# Patient Record
Sex: MALE | Race: WHITE | NOT HISPANIC OR LATINO | Employment: OTHER | URBAN - METROPOLITAN AREA
[De-identification: names, ages, dates, MRNs, and addresses within clinical notes are randomized per-mention and may not be internally consistent; named-entity substitution may affect disease eponyms.]

---

## 2018-03-08 ENCOUNTER — TRANSFERRED RECORDS (OUTPATIENT)
Dept: HEALTH INFORMATION MANAGEMENT | Facility: CLINIC | Age: 73
End: 2018-03-08

## 2019-02-01 ENCOUNTER — TRANSFERRED RECORDS (OUTPATIENT)
Dept: HEALTH INFORMATION MANAGEMENT | Facility: CLINIC | Age: 74
End: 2019-02-01

## 2019-02-06 ENCOUNTER — RECORDS - HEALTHEAST (OUTPATIENT)
Dept: ADMINISTRATIVE | Facility: OTHER | Age: 74
End: 2019-02-06

## 2019-02-07 ENCOUNTER — RECORDS - HEALTHEAST (OUTPATIENT)
Dept: ADMINISTRATIVE | Facility: OTHER | Age: 74
End: 2019-02-07

## 2019-02-07 ENCOUNTER — HOSPITAL ENCOUNTER (OUTPATIENT)
Dept: RADIOLOGY | Facility: HOSPITAL | Age: 74
Discharge: HOME | End: 2019-02-07
Attending: FAMILY MEDICINE

## 2019-02-07 DIAGNOSIS — R13.10 DYSPHAGIA: ICD-10-CM

## 2019-02-13 ENCOUNTER — RECORDS - HEALTHEAST (OUTPATIENT)
Dept: ADMINISTRATIVE | Facility: OTHER | Age: 74
End: 2019-02-13

## 2019-02-19 ENCOUNTER — TRANSFERRED RECORDS (OUTPATIENT)
Dept: HEALTH INFORMATION MANAGEMENT | Facility: CLINIC | Age: 74
End: 2019-02-19

## 2019-02-19 ENCOUNTER — RECORDS - HEALTHEAST (OUTPATIENT)
Dept: ADMINISTRATIVE | Facility: OTHER | Age: 74
End: 2019-02-19

## 2019-02-19 ENCOUNTER — HOSPITAL ENCOUNTER (OUTPATIENT)
Dept: PET IMAGING | Facility: HOSPITAL | Age: 74
Discharge: HOME | End: 2019-02-19
Attending: FAMILY MEDICINE

## 2019-02-19 DIAGNOSIS — K62.89 MASS OF PERIRECTAL SOFT TISSUE: ICD-10-CM

## 2019-02-19 LAB — GLUCOSE BLDC GLUCOMTR-MCNC: 92 MG/DL (ref 70–139)

## 2019-02-19 ASSESSMENT — MIFFLIN-ST. JEOR: SCORE: 1836.69

## 2019-02-21 ENCOUNTER — HOSPITAL ENCOUNTER (OUTPATIENT)
Dept: CT IMAGING | Facility: HOSPITAL | Age: 74
Discharge: HOME | End: 2019-02-21
Attending: FAMILY MEDICINE | Admitting: RADIOLOGY

## 2019-02-21 ENCOUNTER — TRANSFERRED RECORDS (OUTPATIENT)
Dept: HEALTH INFORMATION MANAGEMENT | Facility: CLINIC | Age: 74
End: 2019-02-21

## 2019-02-21 DIAGNOSIS — K62.89 MASS OF PERIRECTAL SOFT TISSUE: ICD-10-CM

## 2019-02-21 LAB
HGB BLD-MCNC: 15.1 G/DL (ref 14–18)
INR PPP: 0.96 (ref 0.9–1.1)
PLATELET # BLD AUTO: 213 THOU/UL (ref 140–440)

## 2019-02-21 ASSESSMENT — MIFFLIN-ST. JEOR: SCORE: 1836.69

## 2019-02-26 LAB
CAP COMMENT: ABNORMAL
LAB AP CHARGES (HE HISTORICAL CONVERSION): ABNORMAL
LAB AP INITIAL CYTO EVAL (HE HISTORICAL CONVERSION): ABNORMAL
LAB MED GENERAL PATH INTERP (HE HISTORICAL CONVERSION): ABNORMAL
PATH REPORT.COMMENTS IMP SPEC: ABNORMAL
PATH REPORT.COMMENTS IMP SPEC: ABNORMAL
PATH REPORT.FINAL DX SPEC: ABNORMAL
PATH REPORT.MICROSCOPIC SPEC OTHER STN: ABNORMAL
PATH REPORT.RELEVANT HX SPEC: ABNORMAL
SPECIMEN DESCRIPTION: ABNORMAL

## 2019-03-08 ENCOUNTER — TRANSFERRED RECORDS (OUTPATIENT)
Dept: HEALTH INFORMATION MANAGEMENT | Facility: CLINIC | Age: 74
End: 2019-03-08

## 2019-03-12 ENCOUNTER — TRANSFERRED RECORDS (OUTPATIENT)
Dept: HEALTH INFORMATION MANAGEMENT | Facility: CLINIC | Age: 74
End: 2019-03-12

## 2019-03-12 ENCOUNTER — TELEPHONE (OUTPATIENT)
Dept: SURGERY | Facility: CLINIC | Age: 74
End: 2019-03-12

## 2019-03-12 NOTE — TELEPHONE ENCOUNTER
[facility] Call Center    Phone Message    May a detailed message be left on voicemail: yes    Reason for Call: Other: Melissa Dykes MD referring Pt for possible malignany metastatic schwannoma in ischioractal fossa. No records received as of yet - only referral. Pt previously seen at [facility]      Action Taken: Message routed to:  [facility] ([facility] Colon Rectal

## 2019-03-21 ENCOUNTER — TELEPHONE (OUTPATIENT)
Dept: SURGERY | Facility: CLINIC | Age: 74
End: 2019-03-21

## 2019-03-21 DIAGNOSIS — D12.6: Primary | ICD-10-CM

## 2019-03-21 NOTE — TELEPHONE ENCOUNTER
[facility] Call Center    Phone Message    May a detailed message be left on voicemail: yes    Reason for Call: Other: Patient is calling in regarding a MRI and stated he talked to   TYRON MCPHERSON and she told him he needs an MRI before he sees  and there is no order and he never heard back. He is calling to check status on this. Please follow up with the patient asap. Thank you.    Action Taken: Message routed to:  [facility] ([facility] colon and rectal

## 2019-03-22 NOTE — TELEPHONE ENCOUNTER
Patient was called back to schedule his MRI prior to his clinic visit with Dr. Gold. Patient is scheduled for Sunday at 7:00 am. Patient stated understanding of appointment date, time, and location. Patient is in agreement with this plan of care. Patient's questions and concerns were addressed to his stated satisfaction. Patient will callback directly with further questions or concerns.

## 2019-03-22 NOTE — TELEPHONE ENCOUNTER
Patient scheduled to see Dr. Gold on 3/26 with his MRI on 3/25. Patient stated understanding of appointment date, time, and location. Patient is in agreement with this plan of care. Patient's questions and concerns were addressed to his stated satisfaction. Patient will callback directly with further questions or concerns.

## 2019-03-24 ENCOUNTER — ANCILLARY PROCEDURE (OUTPATIENT)
Dept: MRI IMAGING | Facility: CLINIC | Age: 74
End: 2019-03-24
Attending: COLON & RECTAL SURGERY
Payer: COMMERCIAL

## 2019-03-24 DIAGNOSIS — D12.6: ICD-10-CM

## 2019-03-24 RX ORDER — GADOBUTROL 604.72 MG/ML
10 INJECTION INTRAVENOUS ONCE
Status: COMPLETED | OUTPATIENT
Start: 2019-03-24 | End: 2019-03-24

## 2019-03-24 RX ADMIN — GADOBUTROL 10 ML: 604.72 INJECTION INTRAVENOUS at 07:02

## 2019-03-24 NOTE — DISCHARGE INSTRUCTIONS
MRI Contrast Discharge Instructions    The IV contrast you received today will pass out of your body in your  urine. This will happen in the next 24 hours. You will not feel this process.  Your urine will not change color.    Drink at least 4 extra glasses of water or juice today (unless your doctor  has restricted your fluids). This reduces the stress on your kidneys.  You may take your regular medicines.    If you are on dialysis: It is best to have dialysis today.    If you have a reaction: Most reactions happen right away. If you have  any new symptoms after leaving the hospital (such as hives or swelling),  call your hospital at the correct number below. Or call your family doctor.  If you have breathing distress or wheezing, call 911.    Special instructions: ***    I have read and understand the above information.    Signature:______________________________________ Date:___________    Staff:__________________________________________ Date:___________     Time:__________    [place] [facility] Departments:    ___[place]: [phone]  ___[facility]: [phone]  ___[place]: [phone] ___[facility]: [phone]  ___[facility]: [phone]  ___[facility]: [phone]  ___[phone]  ___[facility]: [phone]  ___[phone]

## 2019-03-25 NOTE — PROGRESS NOTES
Colon and Rectal Surgery Clinic Note    RE: Walt Jennings  : 1945  CONCEPCION: 3/26/2019    Mirela is a very pleasant 74 year old male who presents today for ischiorectal fossa schwannoma.    HPI:      Perirectal mass in  with biopsy showing a peripheral nerve sheath tumor consistent with Schwannoma    He reportedly saw an oncologist with no further recommendations at that time and was having radiation of his head and neck cancer at that time    He had an MRI of his spine in  that showed several lumbar intrathecal masses. He saw Dr. Antonia Rodriguez for these and she thought they likely could be shwanommas and unlikely to be malignant.    He developed abdominal pain after a trip to [place] in January    CT 2019 with a poorly characterized 5.3 X 2.5 cm soft tissue mass adjacent to the right ischial tuberosity extending into the right ischiorectal fossa and an indeterminate small 7 mm soft tissue nodule within the fat in the right ileac fossa    PET/CT 19 showed FDG avid soft tissue masses in the ischioanal fossae bilaterally.     CT guided needle biopsy of the left sided nodule showed spindle cell lesion with focal atypia    He was seen by Dr. Melissa Dykes a few weeks ago who referred him here for another opinion.    3/24/19 MRI:   1. In the right ischiorectal fossa extending to the sciatic notch,   there is a 7.2 cm well-circumscribed mass. Per chart review, this mass   was biopsied on 2011 with pathology results of a schwannoma.   Although exact comparison of size from 2011 is limited due to   single axial acquisition from the prior exam, the mass appears similar   in size.   2. Along the medial margin of the left gluteus daylin muscle, there   is a 2.7 cm well-circumscribed mass. Per chart review, this mass was   biopsied 2019 with pathology results of a spindle cell tumor.   Although exact comparison of size from 2011 is limited due to   single axial acquisition from the prior exam,  "the mass appears   unchanged in size.   3. 5 mm nodule anterior to the right gluteus daylin musculature could   represent a 3rd nerve sheath tumor other differential also includes   enhancing lymph node.   4. Prostatomegaly.    PMH: Cutaneous melanoma X2, squamous cell carcinoma of the head and neck; he has had several soft tissue lesions ?lipomas removed and has a few palpable subcutaneous masses on his arms currently.  FH: Mother with lung cancer. Multiple members of his mother's family and his brothers with multiple soft tissue masses path unknown.  SH: Bilateral hip replacement. Left hip was recalled and revised but he continues to have decreased mobility on this side. Inguinal hernia repair in 2015 and two abdominal hernia repairs in the past.  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 No ETOH or tobacco use.    Interval History: Mirela reports that he is completely asymptomatic. He denies numbness, tingling, weakness, pain, difficulty walking, or difficulty with bowel movements.   Physical examination:  Examination was chaperoned by Bianca Tran, NP     Vitals: BP (!) 163/96 (BP Location: Left arm, Patient Position: Sitting, Cuff Size: Adult Regular)   Pulse 69   Ht 5' 9\"   Wt 251 lb 9.6 oz   SpO2 98%   BMI 37.15 kg/m    BMI= Body mass index is 37.15 kg/m .    Alert, oriented, in no acute distress, sitting comfortable. Mucous membranes moist. Bilateral arms with small, smooth lesions palpable on undersides of upper arms.  Favor lipoma but cannot exclude neurofibroma or schwannoma    Laboratory data:    Recent Labs   Lab Test 09/22/11  0704  09/21/11  0615   WBC  --   --  7.0   HGB 9.6*  --  9.3*   PLT  --   --  299   CR  --   --  0.80   INR 1.10   < > 1.10    < > = values in this interval not displayed.       Assessment/plan: He is currently asymptomatic. I reviewed Mirela's imaging with radiology, Dr. Corrie Orozco.  I think these masses likely represent benign schwannomas but I am " concerned about the heterogenicity and size of the right sided mass and would consider biopsy of this site to ensure it is not a malignant peripheral nerve sheath tumor. The mass seems to arise from the sciatic nerve making resection potentially hazardous, particularly given that the patient is asymtomatic. I briefly reviewed his case with Dr. Ovi Collado and discussed potential role of biopsy of the larger right sided lesion and EMG of the RLE to detect potential nerve compression. Dr. Collado recommended that Mirela see Dr. Missael Pack who is a neuro oncologist and he did not feel that EMG would guide therapy in the absence of symptoms.  The large number of diffuse lesions in the spine and pelvis raises the question of schwannomatosis versus less likely neurofibromatosis, particularly in the setting of a positive family history on the maternal side of numerous soft tissue masses that required excision.  Accordingly I have recommended that he meet with one of our genetic counselors for genetic testing. We will place referrals to both Dr. Pack and to the Cancer Risk Management Program.  Patient's questions were answered to his stated satisfaction and he is in agreement with this plan.    Total face to face time was 45 minutes, >50% counseling.    For details of past medical history, surgical history, family history, medications, allergies, and review of systems, please see details below.    Medical history:  Past Medical History:   Diagnosis Date     Cancer of neck (H)     recent radiation treatment     Chronic pain        Surgical history:  Past Surgical History:   Procedure Laterality Date     ARTHROPLASTY REVISION HIP  9/12/2011    Procedure:ARTHROPLASTY REVISION HIP; Left Total Hip Revision, Cup Only; Surgeon:JILL SANDERS; Location:[facility]     ARTHROSCOPY KNEE      right     BACK SURGERY      lower     HERNIA REPAIR       JOINT REPLACEMENT      left       Family history:  No family history on  "file.    Medications:  Current Outpatient Medications   Medication Sig Dispense Refill     ibuprofen (IBU-200) 200 MG tablet Take 200 mg by mouth every 4 hours as needed.       sildenafil (VIAGRA) 50 MG tablet Take 1 tablet by mouth daily as needed.         Allergies:  The patientis allergic to percocet [oxycodone-acetaminophen].    Social history:  Social History     Tobacco Use     Smoking status: Never Smoker     Smokeless tobacco: Never Used   Substance Use Topics     Alcohol use: Yes     Comment: 1-2 per month     Marital status: .    Review of Systems:  Nursing Notes:   Rona Ware EMT  3/26/2019  9:02 AM  Signed  Chief Complaint   Patient presents with     Consult     Metastatic Schwannoma of ischoirectal fossa.       Vitals:    03/26/19 0856   BP: (!) 163/96   BP Location: Left arm   Patient Position: Sitting   Cuff Size: Adult Regular   Pulse: 69   SpO2: 98%   Weight: 251 lb 9.6 oz   Height: 5' 9\"       Body mass index is 37.15 kg/m .      Rona Ware, EMT                             David Gold MD   Professor and Chief  Division of Colon and Rectal Surgery  [facility]      Referring Provider:  Melissa Dykes MD  [facility]  [address]  [place] [address]     Primary Care Provider:  Johnny Rausch    This note was created using speech recognition software and may contain unintended word substitutions.  "

## 2019-03-26 ENCOUNTER — OFFICE VISIT (OUTPATIENT)
Dept: SURGERY | Facility: CLINIC | Age: 74
End: 2019-03-26
Payer: COMMERCIAL

## 2019-03-26 VITALS
OXYGEN SATURATION: 98 % | BODY MASS INDEX: 37.26 KG/M2 | HEIGHT: 69 IN | DIASTOLIC BLOOD PRESSURE: 96 MMHG | WEIGHT: 251.6 LBS | HEART RATE: 69 BPM | SYSTOLIC BLOOD PRESSURE: 163 MMHG

## 2019-03-26 DIAGNOSIS — D36.10 SCHWANNOMA: Primary | ICD-10-CM

## 2019-03-26 ASSESSMENT — PATIENT HEALTH QUESTIONNAIRE - PHQ9
SUM OF ALL RESPONSES TO PHQ QUESTIONS 1-9: 0
SUM OF ALL RESPONSES TO PHQ QUESTIONS 1-9: 0
10. IF YOU CHECKED OFF ANY PROBLEMS, HOW DIFFICULT HAVE THESE PROBLEMS MADE IT FOR YOU TO DO YOUR WORK, TAKE CARE OF THINGS AT HOME, OR GET ALONG WITH OTHER PEOPLE: NOT DIFFICULT AT ALL

## 2019-03-26 ASSESSMENT — MIFFLIN-ST. JEOR: SCORE: 1876.63

## 2019-03-26 NOTE — NURSING NOTE
"Chief Complaint   Patient presents with     Consult     Metastatic Schwannoma of ischoirectal fossa.       Vitals:    03/26/19 0856   BP: (!) 163/96   BP Location: Left arm   Patient Position: Sitting   Cuff Size: Adult Regular   Pulse: 69   SpO2: 98%   Weight: 251 lb 9.6 oz   Height: 5' 9\"       Body mass index is 37.15 kg/m .      Rona Ware, EMT                      "

## 2019-03-26 NOTE — LETTER
3/26/2019       RE: Walt Jennings  [address]  [place] [address]     Dear Colleague,    Thank you for referring your patient, Walt Jennings, to the [facility] COLON AND RECTAL SURGERY at [facility]. Please see a copy of my visit note below.    Colon and Rectal Surgery Clinic Note    RE: Walt Jennings  : 1945  CONCEPCION: 3/26/2019    Mirela is a very pleasant 74 year old male who presents today for ischiorectal fossa schwannoma.    HPI:    Perirectal mass in  with biopsy showing a peripheral nerve sheath tumor consistent with Schwannoma    He reportedly saw an oncologist with no further recommendations at that time and was having radiation of his head and neck cancer at that time    He had an MRI of his spine in  that showed several lumbar intrathecal masses. He saw Dr. Antonia Rodriguez for these and she thought they likely could be shwanommas and unlikely to be malignant.    He developed abdominal pain after a trip to [place] in January    CT 2019 with a poorly characterized 5.3 X 2.5 cm soft tissue mass adjacent to the right ischial tuberosity extending into the right ischiorectal fossa and an indeterminate small 7 mm soft tissue nodule within the fat in the right ileac fossa    PET/CT 19 showed FDG avid soft tissue masses in the ischioanal fossae bilaterally.     CT guided needle biopsy of the left sided nodule showed spindle cell lesion with focal atypia    He was seen by Dr. Melissa Dykes a few weeks ago who referred him here for another opinion.    3/24/19 MRI:   1. In the right ischiorectal fossa extending to the sciatic notch,   there is a 7.2 cm well-circumscribed mass. Per chart review, this mass   was biopsied on 2011 with pathology results of a schwannoma.   Although exact comparison of size from 2011 is limited due to   single axial acquisition from the prior exam, the mass appears similar   in size.   2. Along the medial margin  "of the left gluteus daylin muscle, there   is a 2.7 cm well-circumscribed mass. Per chart review, this mass was   biopsied 2/21/2019 with pathology results of a spindle cell tumor.   Although exact comparison of size from 2/28/2011 is limited due to   single axial acquisition from the prior exam, the mass appears   unchanged in size.   3. 5 mm nodule anterior to the right gluteus daylin musculature could   represent a 3rd nerve sheath tumor other differential also includes   enhancing lymph node.   4. Prostatomegaly.    PMH: Cutaneous melanoma X2, squamous cell carcinoma of the head and neck; he has had several soft tissue lesions ?lipomas removed and has a few palpable subcutaneous masses on his arms currently.  FH: Mother with lung cancer. Multiple members of his mother's family and his brothers with multiple soft tissue masses path unknown.  SH: Bilateral hip replacement. Left hip was recalled and revised but he continues to have decreased mobility on this side. Inguinal hernia repair in 2015 and two abdominal hernia repairs in the past.  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 No ETOH or tobacco use.    Interval History: Mirela reports that he is completely asymptomatic. He denies numbness, tingling, weakness, pain, difficulty walking, or difficulty with bowel movements.   Physical examination:  Examination was chaperoned by Bianca Tran NP     Vitals: BP (!) 163/96 (BP Location: Left arm, Patient Position: Sitting, Cuff Size: Adult Regular)   Pulse 69   Ht 5' 9\"   Wt 251 lb 9.6 oz   SpO2 98%   BMI 37.15 kg/m     BMI= Body mass index is 37.15 kg/m .    Alert, oriented, in no acute distress, sitting comfortable. Mucous membranes moist. Bilateral arms with small, smooth lesions palpable on undersides of upper arms.  Favor lipoma but cannot exclude neurofibroma or schwannoma    Laboratory data:    Recent Labs   Lab Test 09/22/11  0704  09/21/11  0615   WBC  --   --  7.0   HGB 9.6*  " --  9.3*   PLT  --   --  299   CR  --   --  0.80   INR 1.10   < > 1.10    < > = values in this interval not displayed.       Assessment/plan: He is currently asymptomatic. I reviewed Mirela's imaging with radiology, Dr. Corrie Orozco.  I think these masses likely represent benign schwannomas but I am concerned about the heterogenicity and size of the right sided mass and would consider biopsy of this site to ensure it is not a malignant peripheral nerve sheath tumor. The mass seems to arise from the sciatic nerve making resection potentially hazardous, particularly given that the patient is asymtomatic. I briefly reviewed his case with Dr. Ovi Collado and discussed potential role of biopsy of the larger right sided lesion and EMG of the RLE to detect potential nerve compression. Dr. Collado recommended that Mirela see Dr. Missael Pack who is a neuro oncologist and he did not feel that EMG would guide therapy in the absence of symptoms.  The large number of diffuse lesions in the spine and pelvis raises the question of schwannomatosis versus less likely neurofibromatosis, particularly in the setting of a positive family history on the maternal side of numerous soft tissue masses that required excision.  Accordingly I have recommended that he meet with one of our genetic counselors for genetic testing. We will place referrals to both Dr. Pack and to the Cancer Risk Management Program.  Patient's questions were answered to his stated satisfaction and he is in agreement with this plan.    Total face to face time was 45 minutes, >50% counseling.    For details of past medical history, surgical history, family history, medications, allergies, and review of systems, please see details below.    Medical history:  Past Medical History:   Diagnosis Date     Cancer of neck (H)     recent radiation treatment     Chronic pain        Surgical history:  Past Surgical History:   Procedure Laterality Date     ARTHROPLASTY REVISION HIP   "9/12/2011    Procedure:ARTHROPLASTY REVISION HIP; Left Total Hip Revision, Cup Only; Surgeon:JILL SANDERS; Location:[facility]     ARTHROSCOPY KNEE      right     BACK SURGERY      lower     HERNIA REPAIR       JOINT REPLACEMENT      left       Family history:  No family history on file.    Medications:  Current Outpatient Medications   Medication Sig Dispense Refill     ibuprofen (IBU-200) 200 MG tablet Take 200 mg by mouth every 4 hours as needed.       sildenafil (VIAGRA) 50 MG tablet Take 1 tablet by mouth daily as needed.         Allergies:  The patientis allergic to percocet [oxycodone-acetaminophen].    Social history:  Social History     Tobacco Use     Smoking status: Never Smoker     Smokeless tobacco: Never Used   Substance Use Topics     Alcohol use: Yes     Comment: 1-2 per month     Marital status: .    Review of Systems:  Nursing Notes:   Rona Ware EMT  3/26/2019  9:02 AM  Signed  Chief Complaint   Patient presents with     Consult     Metastatic Schwannoma of ischoirectal fossa.       Vitals:    03/26/19 0856   BP: (!) 163/96   BP Location: Left arm   Patient Position: Sitting   Cuff Size: Adult Regular   Pulse: 69   SpO2: 98%   Weight: 251 lb 9.6 oz   Height: 5' 9\"       Body mass index is 37.15 kg/m .    Rona Ware, EMT     David Gold MD   Professor and Chief  Division of Colon and Rectal Surgery  [facility]    Referring Provider:  Melissa Dykes MD  [facility]  [address] [place] [address] [address]  [place] [address]     Primary Care Provider:  Johnny Rausch    This note was created using speech recognition software and may contain unintended word substitutions.    "

## 2019-03-26 NOTE — PATIENT INSTRUCTIONS
Please call with any questions or concerns regarding your clinic visit today.    It is a pleasure being involved in your health care.    Contacts post-consultation depending on your need:    Radiology Appointments [phone]    Schedule Clinic Appointments [phone] # 1   M-F 7:30 - 5 pm    [facility] [phone]    Clinic Fax Number [phone]    Surgery Scheduling [phone]    My Chart is available 24 hours a day and is a secure way to access your records and communicate with your care team.  I strongly recommend signing up if you haven't already done so, if you are comfortable with computers.  If you would like to inquire about this or are having problems with My Chart access, you may call [phone] or go online at [email or web address].  Please allow at least 24 hours for a response and extra time on weekends and Holidays.

## 2019-03-27 ENCOUNTER — TELEPHONE (OUTPATIENT)
Dept: ONCOLOGY | Facility: CLINIC | Age: 74
End: 2019-03-27

## 2019-03-27 ASSESSMENT — PATIENT HEALTH QUESTIONNAIRE - PHQ9: SUM OF ALL RESPONSES TO PHQ QUESTIONS 1-9: 0

## 2019-03-27 NOTE — TELEPHONE ENCOUNTER
ONCOLOGY INTAKE: Records Information      APPT INFORMATION: [place]07 [place] [facility]  Referring provider:  Schwannoma   Referring provider s clinic:  [facility]  Reason for visit/diagnosis:  Schwannoma [D36.10    Were the records received with the referral (via [employer])? Complete    Has patient been seen for any external appt for this diagnosis (enter clinic/location)? No    ADDITIONAL INFORMATION:  Dx:Schwannoma [D36.10]: Caller intake: Ref by:Schwannoma [D36.10]-[facility]: Records In [facility]

## 2019-04-22 ENCOUNTER — PATIENT OUTREACH (OUTPATIENT)
Dept: SURGERY | Facility: CLINIC | Age: 74
End: 2019-04-22

## 2019-04-22 NOTE — PROGRESS NOTES
Patient was called back in regard to his voicemail patient's wife was advised that we are trying to move his follow up appointments to a sooner date. Patient's wife stated understanding of these instructions.

## 2019-06-05 ENCOUNTER — OFFICE VISIT (OUTPATIENT)
Dept: CONSULT | Facility: CLINIC | Age: 74
End: 2019-06-05
Attending: PEDIATRICS
Payer: COMMERCIAL

## 2019-06-05 ENCOUNTER — OFFICE VISIT (OUTPATIENT)
Dept: PEDIATRIC HEMATOLOGY/ONCOLOGY | Facility: CLINIC | Age: 74
End: 2019-06-05
Attending: PEDIATRICS
Payer: COMMERCIAL

## 2019-06-05 VITALS
BODY MASS INDEX: 36.63 KG/M2 | SYSTOLIC BLOOD PRESSURE: 112 MMHG | DIASTOLIC BLOOD PRESSURE: 93 MMHG | TEMPERATURE: 98.6 F | RESPIRATION RATE: 20 BRPM | OXYGEN SATURATION: 97 % | HEART RATE: 117 BPM | WEIGHT: 248.02 LBS

## 2019-06-05 DIAGNOSIS — C44.92 SQUAMOUS CELL CARCINOMA OF SKIN: Primary | ICD-10-CM

## 2019-06-05 DIAGNOSIS — D36.10 SCHWANNOMA: ICD-10-CM

## 2019-06-05 DIAGNOSIS — Z85.819 HISTORY OF THROAT CANCER: ICD-10-CM

## 2019-06-05 DIAGNOSIS — C43.59 MALIGNANT MELANOMA OF SKIN OF TRUNK (H): ICD-10-CM

## 2019-06-05 DIAGNOSIS — D12.6: ICD-10-CM

## 2019-06-05 DIAGNOSIS — Z85.820 HISTORY OF MELANOMA: Primary | ICD-10-CM

## 2019-06-05 PROCEDURE — 96040 ZZH GENETIC COUNSELING, EACH 30 MINUTES: CPT | Mod: ZF | Performed by: GENETIC COUNSELOR, MS

## 2019-06-05 PROCEDURE — G0463 HOSPITAL OUTPT CLINIC VISIT: HCPCS | Mod: ZF

## 2019-06-05 ASSESSMENT — ENCOUNTER SYMPTOMS
FEVER: 0
NUMBNESS: 0
EYE ITCHING: 0
GASTROINTESTINAL NEGATIVE: 1
WEAKNESS: 0
PHOTOPHOBIA: 0
FATIGUE: 0
CARDIOVASCULAR NEGATIVE: 1
NEUROLOGICAL NEGATIVE: 1
RESPIRATORY NEGATIVE: 1
CHILLS: 0
PSYCHIATRIC NEGATIVE: 1
CONSTITUTIONAL NEGATIVE: 1
EYE PAIN: 0
UNEXPECTED WEIGHT CHANGE: 0

## 2019-06-05 NOTE — LETTER
Date:June 7, 2019      Provider requested that no letter be sent. Do not send.       [facility] Health Information

## 2019-06-05 NOTE — LETTER
6/5/2019      RE: Walt Jennings  [address]  [place] [address]          Pediatric Hematology/Oncology Clinic Note       HPI-  Walt Jennings is a 74 year old male with pelvic schwannomas schwannoma who presents to the clinic for a consultation as requested by Dr. Gold regarding possible Schwannomatosis. Patient was first found to have incidental finding in right pelvis on whole body imaging after diagnosis of throat cancer in 2011. Biopsy found the lesion to be a schwannoma, but no treatment was started due to patient remaining asymptomatic.    Walt presented in February 2019 with one day of nausea, vomiting, diarrhea and frequent urination shortly after returning from [place]. Imaging during hospitalization found 2 additional small masses close by. PET scan showed focal atypia. MRI on 3/24/19 and follow-up biopsy identified the same schwannoma from 2011, a spindle cell tumor, and a 5 mm nodule near the R sciatic nerve.    Since then, Walt has remained asymptomatic. He has residual left hip pain from a hip replacement that was followed by a  revision. He ambulates without pain when he uses a cane, and often neglects to use the cane as well. He denies weight change, fatigue, night sweats, headaches, numbness/tingling, or any other pain.     Fam/Soc: Walt is a retired , who lives with his wife. He is an avid fisherman and travels frequently to go fishing. There is no history of neurofibromatosis in his family.    Walt has a history of melanoma s/p surgical removal in 2010, throat cancer s/p surgery and radiation in 2011 and left hip replacement.    History was obtained from the patient.       Allergies   Allergen Reactions     Percocet [Oxycodone-Acetaminophen] Diarrhea       Current Outpatient Medications   Medication     ibuprofen (IBU-200) 200 MG tablet     sildenafil (VIAGRA) 50 MG tablet     No current facility-administered medications for this visit.         Past Medical History:   Diagnosis Date     Cancer of neck (H)     recent radiation treatment     Chronic pain        Past Surgical History:   Procedure Laterality Date     ARTHROPLASTY REVISION HIP  9/12/2011    Procedure:ARTHROPLASTY REVISION HIP; Left Total Hip Revision, Cup Only; Surgeon:JILL SANDERS; Location:[facility]     ARTHROSCOPY KNEE      right     BACK SURGERY      lower     HERNIA REPAIR       JOINT REPLACEMENT      left       No family history on file.    Review of Systems   Constitutional: Negative.  Negative for chills, fatigue, fever and unexpected weight change.   HENT: Negative.    Eyes: Negative for photophobia, pain and itching.        Positive for watery eyes.   Respiratory: Negative.    Cardiovascular: Negative.    Gastrointestinal: Negative.    Genitourinary: Negative.    Skin:        2 lumps on right arm   Neurological: Negative.  Negative for weakness and numbness.   Psychiatric/Behavioral: Negative.        BP (!) 112/93 (BP Location: Left arm, Patient Position: Fowlers, Cuff Size: Adult Regular)   Pulse 117   Temp 98.6  F (37  C) (Oral)   Resp 20   Wt 112.5 kg (248 lb 0.3 oz)   SpO2 97%   BMI 36.63 kg/m     Physical Exam   Constitutional: He is oriented to person, place, and time. He appears well-developed and well-nourished.   HENT:   Head: Normocephalic and atraumatic.   Eyes: Pupils are equal, round, and reactive to light. Conjunctivae and EOM are normal.   Neck: Normal range of motion. Neck supple.   Cardiovascular: Normal rate, regular rhythm and normal heart sounds.   Pulmonary/Chest: Effort normal and breath sounds normal.   Abdominal: Soft. Bowel sounds are normal.   Musculoskeletal: He exhibits tenderness.   Left hip tenderness over replacement site     Neurological: He is alert and oriented to person, place, and time.   Skin: Skin is warm and dry.   Psychiatric: He has a normal mood and affect. His behavior is normal.         Results for orders placed or performed  in visit on 03/24/19   MR Pelvis (Intrapelvic Organs) wo&w Contrast    Narrative    EXAMINATION: MR PELVIS (INTRAPELVIC ORGANS) WO&W CONTRAST,  3/24/2019 8:21 AM     COMPARISON: PET/CT 2/19/2019    TECHNIQUE:  Images were acquired without and with intravenous contrast  through the pelvis. The following MR images were acquired without  contrast: multiplanar T1, multiplanar T2, axial diffusion-weighted and  axial apparent diffusion coefficient. T1-weighted images with fat  saturation were acquired at the following intervals relative to  intravenous contrast administration: pre-contrast, immediate post  contrast, 1 minute, 3 minutes and delayed.  Contrast dose: 10 mL  Gadavist, 1 mg glucagon    HISTORY: malignancy metastatic schwannoma; Schwannoma of colon    FINDINGS:    In the right ischiorectal fossa, there is a 7.2 x 3.2 cm ovoid  well-circumscribed mass which is predominantly T2 hyperintense, avidly  enhancing, and demonstrates restricted diffusion (series 7 images  18-30). The mass extends into the sciatic notch and potentially arises  from a posterior branch of the sciatic nerve. No involvement of the  anal sphincter complex. Although exact comparison of size from  2/28/2011 is limited due to single axial acquisition from the prior  exam, the mass appears similar in size.    Along the medial margin of the left gluteus daylin muscle, there is a  similar-appearing 2.7 x 1.6 cm ovoid well-circumscribed mass which is  predominantly T2 hyperintense, avidly enhancing, and demonstrates  restricted diffusion (series 8 images 22-25). Although exact  comparison of size from 2/28/2011 is limited due to single axial  acquisition from the prior exam, the mass appears unchanged in size.     Just anterior to the right gluteus musculature on series 23 image 45  and series 7 image 32 there is a nodule measuring 5 mm in short axis  which is enhancing with T2 hyperintensity. Although small, the imaging  characteristics are  similar to the other pelvic masses.    No rectosigmoid tumor. Diverticulosis of the sigmoid colon. The  prostate gland measures 4.2 x 6.0 x 5.4 cm (71 cc). Multifocal  wedge-shaped T2 hypointensity throughout the peripheral zone without  focal nodularity. No free fluid or suspicious lymphadenopathy.  Bilateral hip arthroplasties.      Impression    IMPRESSION:   1. In the right ischiorectal fossa extending to the sciatic notch,  there is a 7.2 cm well-circumscribed mass. Per chart review, this mass  was biopsied on 2/28/2011 with pathology results of a schwannoma.  Although exact comparison of size from 2/28/2011 is limited due to  single axial acquisition from the prior exam, the mass appears similar  in size.  2. Along the medial margin of the left gluteus daylin muscle, there  is a 2.7 cm well-circumscribed mass. Per chart review, this mass was  biopsied 2/21/2019 with pathology results of a spindle cell tumor.  Although exact comparison of size from 2/28/2011 is limited due to  single axial acquisition from the prior exam, the mass appears  unchanged in size.  3. 5 mm nodule anterior to the right gluteus daylin musculature could  represent a 3rd nerve sheath tumor other differential also includes  enhancing lymph node.  4. Prostatomegaly.    I have personally reviewed the examination and initial interpretation  and I agree with the findings.    ZEKE PERRY MD         Impression:  1. Schwannomas - MRI shows several lesions in pelvic area including one heterogeneous larger lesion in right ischiorectal fossa concerning for malignant degeneration. Differential diagnosis includes schwannomatosis and NF2.    Plan:  1. RTC for PET scan in Late July.  2. Schedule surgery to remove pelvic lesion with known cellular atypia and heterogeneity, as well as left gluteal schwannoma.  3. Genetic testing for NF2 to assess risk for family members, pending insurance approval.  4. MRI brain, spine, and pelvis to assess for  other schwannomas or characteristic lesions of NF2..          Gregorio Pack    CC  Patient Care Team:  Johnny Rausch MD as PCP - General  COLLEEN ROME    Copy to patient  RAN HOLMAN  [address]  [facility] [address]    Thank you for choosing [facility]!   It was a pleasure to see you in our Neurofibromatosis Clinic today.  [email or web address]    Here's our recommendations for follow-up care:    Referrals/Tests/Plan:    Please see your dermatologist for follow-up of moles, especially the one on your right cheek.    PET scan and MRI scans (Brain/IACs, spine and pelvis) and return to see Dr. Pack.  Our  will arrange these appts and contact you with the details.    Call if you develop any of the following:    Rapidly growing or painful lump    New or worsening pain, numbness, tingling or weakness    Any other new or concerning symptom you would like to discuss    ------------------------------------------------------------------------------------------------------------------------------    Neurofibromatosis (NF) Clinic  [facility], 9th Floor - [address] [address]  Fax: [phone]   Scheduling/Appointments: [phone]  Jerilyn B[place]   Phone: [phone]   Services: [phone]   Infusion Center/Lab: [phone]   Radiology/[phone] (Pediatrics) / [phone] (Adults)  Pediatric Specialty Call Center: [phone]  Adult Specialty Call Center: [phone]     Concerns or questions for your care team:  Monday - Friday, 8:00 am - 5:00 pm:    Non-urgent concerns: Voicemail: [phone]    Urgent concerns: [facility]: [phone].  Nights and weekends:   Call [phone] and ask the  to page the 'Pediatric Hematology/Oncology fellow on call' if you have  an urgent concern that can't wait until the clinic opens.    ------------------------------------------------------------------------------------------------------------------------------    Neurofibromatosis Team  Gregorio Pack MD - Director, Neurofibromatosis/Pediatric Neuro-Oncology  Francia Gill MD - Pediatric Genetics  Mono Allan MD - Pediatric Genetics  Carmen Gonzales, CNP, APRN - Pediatric Neuro-Oncology  Barbara Singh MS, RN - NF Care Coordinator  Voicemail: [phone]  Pager: [phone]  E-mail: [email or web address]  Hoda Winslow RN - Tumor Care Coordinator     Voicemail: [phone]  TAMMY Damon, SW -      Phone: [phone]  Magdalena Trivedi CGC - Genetic Counselor  Phone: [phone]  Jerilyn Garcia - SchedulerPhone: [phone]      Gregorio Pack MD

## 2019-06-05 NOTE — PROGRESS NOTES
Presenting information: Walt is a 74 year old male with a history of schwannomas. He was referred for a genetics evaluation by Dr. Gold and evaluated by Dr. Pack today.   I met with Walt at the request of Dr. Pack to obtain a personal and family history, discuss possible genetic contributions to his symptoms, and to obtain informed consent for genetic testing if indicated.     Personal History:  Walt has a history of a perirectal mass in  showing peripheral nerve shealth tumor consistent with schwannoma. An MRI of his spine showed several lumbar intrathecal masses that could also be schwannomas. He has a history of melanoma on his chest, lipomas, and throat cancer. He denies a history of known tumors in his ears, significant hearing loss, loss of balance, tinnitus, or ocular concerns. He denies a history of seizure or vascular concerns. He has had polyps on prior colonoscopies, but does not remember how many or what type. See Dr. Pack's note for additional details.     Family History: A three generation pedigree was obtained today and scanned into the EMR. The following information is significant:  - Children: 50yo daughter with back issues after a broken back in childhood. 48yo and 42yo sons with no noted health concerns.  - Siblings: 70yo brother with diabetes and multiple lipomas. 68yo brother with one or two lipomas. No concerns noted for nieces or nephews.   - Maternal: Mother  at 83y from pneumonia; history of lung cancer. Six maternal uncles all thought to have lipomas. No additional tumors/cancers known for maternal uncles/aunts. Limited information known for maternal cousins but no tumors/cancer known. Grandmother  in her 60s from a fall. Grandfather  in his 60s from unknown cause.  - Paternal: Father  at 85y from Alzheimer's disease diagnosed in his 80s. Two paternal aunts  from Alzheimer's disease diagnosed in their 70s. No cancers/tumors noted for paternal  "aunts or cousins.  Grandmother  around 63y from cancer- type unknown. Grandfather  at 38y from \"something with his stomach.\"  - Ancestry: maternal- Scandinavian/Polish; paternal- [place]; consanguinity was denied.    Discussion:    Schwannomas are tumors that grow on the peripheral nerves throughout the body and usually occurs in individuals over the age of 30 years. Schwannomatosis is a condition associated with at least two separate schwannomas and lack of a  schwannoma in the ear (vestibular schwannoma). Other features include pain, numbness and pain associated with the schwannomas.    Individuals who have an inherited type of schwannomatosis have a 50% chance of passing this condition on to each of their children. It is inherited in a dominant pattern in families.     The symptoms may be different in different relatives (variable expression) or a very few symptoms (reduced penetrance).     Schwannomatosis can also be segmental, meaning that it occurs only in one part of the body.    After physical examination, Dr. Pack is not recommending genetic testing for Walt at this time. He would like Walt to have additional MRIs of the brain, spine and pelvis to further assess for NF2. Walt was also given a brochure on NF2 and was encouraged to talk to his children to see how they feel about him having testing.     Plan:  1. Genetic testing not pursued at this time. Follow-up MRIs and Walt was encouraged to talk to his family members about testing prior to making a decision.   2. Return  per Dr. Pack' recommendation.     Magdalena Trivedi MS, [facility]  Licensed Genetic Counselor  [phone]    Approximate Time Spent in Consultation: 30 minutes     CC: no letter  "

## 2019-06-05 NOTE — NURSING NOTE
Chief Complaint   Patient presents with     New Patient     Patient is here today for poss Schwannomatosis follow up     BP (!) 112/93 (BP Location: Left arm, Patient Position: Fowlers, Cuff Size: Adult Regular)   Pulse 117   Temp 98.6  F (37  C) (Oral)   Resp 20   Wt 112.5 kg (248 lb 0.3 oz)   SpO2 97%   BMI 36.63 kg/m      Francia Lewis LPN  June 5, 2019

## 2019-06-05 NOTE — PROGRESS NOTES
Pediatric Hematology/Oncology Clinic Note       HPI-  Walt Jennings is a 74 year old male with pelvic schwannomas schwannoma who presents to the clinic for a consultation as requested by Dr. Gold regarding possible Schwannomatosis. Patient was first found to have incidental finding in right pelvis on whole body imaging after diagnosis of throat cancer in 2011. Biopsy found the lesion to be a schwannoma, but no treatment was started due to patient remaining asymptomatic.    Walt presented in February 2019 with one day of nausea, vomiting, diarrhea and frequent urination shortly after returning from [place]. Imaging during hospitalization found 2 additional small masses close by. PET scan showed focal atypia. MRI on 3/24/19 and follow-up biopsy identified the same schwannoma from 2011, a spindle cell tumor, and a 5 mm nodule near the R sciatic nerve.    Since then, Walt has remained asymptomatic. He has residual left hip pain from a hip replacement that was followed by a  revision. He ambulates without pain when he uses a cane, and often neglects to use the cane as well. He denies weight change, fatigue, night sweats, headaches, numbness/tingling, or any other pain.     Fam/Soc: Walt is a retired , who lives with his wife. He is an avid fisherman and travels frequently to go fishing. There is no history of neurofibromatosis in his family.    Walt has a history of melanoma s/p surgical removal in 2010, throat cancer s/p surgery and radiation in 2011 and left hip replacement.    History was obtained from the patient.       Allergies   Allergen Reactions     Percocet [Oxycodone-Acetaminophen] Diarrhea       Current Outpatient Medications   Medication     ibuprofen (IBU-200) 200 MG tablet     sildenafil (VIAGRA) 50 MG tablet     No current facility-administered medications for this visit.        Past Medical History:   Diagnosis Date     Cancer of neck (H)     recent radiation  treatment     Chronic pain        Past Surgical History:   Procedure Laterality Date     ARTHROPLASTY REVISION HIP  9/12/2011    Procedure:ARTHROPLASTY REVISION HIP; Left Total Hip Revision, Cup Only; Surgeon:JILL SANDERS; Location:[facility]     ARTHROSCOPY KNEE      right     BACK SURGERY      lower     HERNIA REPAIR       JOINT REPLACEMENT      left       No family history on file.    Review of Systems   Constitutional: Negative.  Negative for chills, fatigue, fever and unexpected weight change.   HENT: Negative.    Eyes: Negative for photophobia, pain and itching.        Positive for watery eyes.   Respiratory: Negative.    Cardiovascular: Negative.    Gastrointestinal: Negative.    Genitourinary: Negative.    Skin:        2 lumps on right arm   Neurological: Negative.  Negative for weakness and numbness.   Psychiatric/Behavioral: Negative.        BP (!) 112/93 (BP Location: Left arm, Patient Position: Fowlers, Cuff Size: Adult Regular)   Pulse 117   Temp 98.6  F (37  C) (Oral)   Resp 20   Wt 112.5 kg (248 lb 0.3 oz)   SpO2 97%   BMI 36.63 kg/m    Physical Exam   Constitutional: He is oriented to person, place, and time. He appears well-developed and well-nourished.   HENT:   Head: Normocephalic and atraumatic.   Eyes: Pupils are equal, round, and reactive to light. Conjunctivae and EOM are normal.   Neck: Normal range of motion. Neck supple.   Cardiovascular: Normal rate, regular rhythm and normal heart sounds.   Pulmonary/Chest: Effort normal and breath sounds normal.   Abdominal: Soft. Bowel sounds are normal.   Musculoskeletal: He exhibits tenderness.   Left hip tenderness over replacement site     Neurological: He is alert and oriented to person, place, and time.   Skin: Skin is warm and dry.   Psychiatric: He has a normal mood and affect. His behavior is normal.         Results for orders placed or performed in visit on 03/24/19   MR Pelvis (Intrapelvic Organs) wo&w Contrast    Narrative     EXAMINATION: MR PELVIS (INTRAPELVIC ORGANS) WO&W CONTRAST,  3/24/2019 8:21 AM     COMPARISON: PET/CT 2/19/2019    TECHNIQUE:  Images were acquired without and with intravenous contrast  through the pelvis. The following MR images were acquired without  contrast: multiplanar T1, multiplanar T2, axial diffusion-weighted and  axial apparent diffusion coefficient. T1-weighted images with fat  saturation were acquired at the following intervals relative to  intravenous contrast administration: pre-contrast, immediate post  contrast, 1 minute, 3 minutes and delayed.  Contrast dose: 10 mL  Gadavist, 1 mg glucagon    HISTORY: malignancy metastatic schwannoma; Schwannoma of colon    FINDINGS:    In the right ischiorectal fossa, there is a 7.2 x 3.2 cm ovoid  well-circumscribed mass which is predominantly T2 hyperintense, avidly  enhancing, and demonstrates restricted diffusion (series 7 images  18-30). The mass extends into the sciatic notch and potentially arises  from a posterior branch of the sciatic nerve. No involvement of the  anal sphincter complex. Although exact comparison of size from  2/28/2011 is limited due to single axial acquisition from the prior  exam, the mass appears similar in size.    Along the medial margin of the left gluteus daylin muscle, there is a  similar-appearing 2.7 x 1.6 cm ovoid well-circumscribed mass which is  predominantly T2 hyperintense, avidly enhancing, and demonstrates  restricted diffusion (series 8 images 22-25). Although exact  comparison of size from 2/28/2011 is limited due to single axial  acquisition from the prior exam, the mass appears unchanged in size.     Just anterior to the right gluteus musculature on series 23 image 45  and series 7 image 32 there is a nodule measuring 5 mm in short axis  which is enhancing with T2 hyperintensity. Although small, the imaging  characteristics are similar to the other pelvic masses.    No rectosigmoid tumor. Diverticulosis of the  sigmoid colon. The  prostate gland measures 4.2 x 6.0 x 5.4 cm (71 cc). Multifocal  wedge-shaped T2 hypointensity throughout the peripheral zone without  focal nodularity. No free fluid or suspicious lymphadenopathy.  Bilateral hip arthroplasties.      Impression    IMPRESSION:   1. In the right ischiorectal fossa extending to the sciatic notch,  there is a 7.2 cm well-circumscribed mass. Per chart review, this mass  was biopsied on 2/28/2011 with pathology results of a schwannoma.  Although exact comparison of size from 2/28/2011 is limited due to  single axial acquisition from the prior exam, the mass appears similar  in size.  2. Along the medial margin of the left gluteus daylin muscle, there  is a 2.7 cm well-circumscribed mass. Per chart review, this mass was  biopsied 2/21/2019 with pathology results of a spindle cell tumor.  Although exact comparison of size from 2/28/2011 is limited due to  single axial acquisition from the prior exam, the mass appears  unchanged in size.  3. 5 mm nodule anterior to the right gluteus daylin musculature could  represent a 3rd nerve sheath tumor other differential also includes  enhancing lymph node.  4. Prostatomegaly.    I have personally reviewed the examination and initial interpretation  and I agree with the findings.    ZEKE PERRY MD         Impression:  1. Schwannomas - MRI shows several lesions in pelvic area including one heterogeneous larger lesion in right ischiorectal fossa concerning for malignant degeneration. Differential diagnosis includes schwannomatosis and NF2.    Plan:  1. RTC for PET scan in Late July.  2. Schedule surgery to remove pelvic lesion with known cellular atypia and heterogeneity, as well as left gluteal schwannoma.  3. Genetic testing for NF2 to assess risk for family members, pending insurance approval.  4. MRI brain, spine, and pelvis to assess for other schwannomas or characteristic lesions of NF2..          Gregorio Limon  Sirena PAUL  Patient Care Team:  Johnny Rausch MD as PCP - General  COLLEEN ROME    Copy to patient  RAN HOLMAN  [address] [facility] [address]    Thank you for choosing [facility]!   It was a pleasure to see you in our Neurofibromatosis Clinic today.  [email or web address]    Here's our recommendations for follow-up care:    Referrals/Tests/Plan:    Please see your dermatologist for follow-up of moles, especially the one on your right cheek.    PET scan and MRI scans (Brain/IACs, spine and pelvis) and return to see Dr. Pack.  Our  will arrange these appts and contact you with the details.    Call if you develop any of the following:    Rapidly growing or painful lump    New or worsening pain, numbness, tingling or weakness    Any other new or concerning symptom you would like to discuss    ------------------------------------------------------------------------------------------------------------------------------    Neurofibromatosis (NF) Clinic  [facility], 9th Floor - [address] [place] [address]  Fax: [phone]   Scheduling/Appointments: [phone]  Jerilyn BIRMINGHAM   Phone: [phone]   Services: [phone]   Infusion Center/Lab: [phone]   Radiology/[phone] (Pediatrics) / [phone] (Adults)  Pediatric Specialty Call Center: [phone]  Adult Specialty Call Center: [phone]     Concerns or questions for your care team:  Monday - Friday, 8:00 am - 5:00 pm:    Non-urgent concerns: Voicemail: [phone]    Urgent concerns: [facility]: [phone].  Nights and weekends:   Call [phone] and ask the  to page the 'Pediatric Hematology/Oncology fellow on call' if you have an urgent concern that can't wait until the clinic  opens.    ------------------------------------------------------------------------------------------------------------------------------    Neurofibromatosis Team  Gregorio Pack MD - Director, Neurofibromatosis/Pediatric Neuro-Oncology  Francia Gill MD - Pediatric Genetics  Mono Allan MD - Pediatric Genetics  Carmen Gonzales, CNP, APRN - Pediatric Neuro-Oncology  Barbara Singh MS, RN - NF Care Coordinator  Voicemail: [phone]  Pager: [phone]  E-mail: [email or web address]  Hoda Winslow RN - Tumor Care Coordinator     Voicemail: [phone]  TAMMY Damon, LGSW -      Phone: [phone]  Magdalena Trivedi CGC - Genetic Counselor  Phone: [phone]  Jerilyn Garcia -   Phone: [phone]

## 2019-06-05 NOTE — LETTER
6/5/2019      RE: Walt Jennings  [address]  [place] [address]          Pediatric Hematology/Oncology Clinic Note       HPI-  Walt Jennings is a 74 year old male with pelvic schwannomas schwannoma who presents to the clinic for a consultation as requested by Dr. Gold regarding possible Schwannomatosis. Patient was first found to have incidental finding in right pelvis on whole body imaging after diagnosis of throat cancer in 2011. Biopsy found the lesion to be a schwannoma, but no treatment was started due to patient remaining asymptomatic.    Walt presented in February 2019 with one day of nausea, vomiting, diarrhea and frequent urination shortly after returning from [place]. Imaging during hospitalization found 2 additional small masses close by. PET scan showed focal atypia. MRI on 3/24/19 and follow-up biopsy identified the same schwannoma from 2011, a spindle cell tumor, and a 5 mm nodule near the R sciatic nerve.    Since then, Walt has remained asymptomatic. He has residual left hip pain from a hip replacement that was followed by a  revision. He ambulates without pain when he uses a cane, and often neglects to use the cane as well. He denies weight change, fatigue, night sweats, headaches, numbness/tingling, or any other pain.     Fam/Soc: Walt is a retired , who lives with his wife. He is an avid fisherman and travels frequently to go fishing. There is no history of neurofibromatosis in his family.    Walt has a history of melanoma s/p surgical removal in 2010, throat cancer s/p surgery and radiation in 2011 and left hip replacement.    History was obtained from the patient.       Allergies   Allergen Reactions     Percocet [Oxycodone-Acetaminophen] Diarrhea       Current Outpatient Medications   Medication     ibuprofen (IBU-200) 200 MG tablet     sildenafil (VIAGRA) 50 MG tablet     No current facility-administered medications for this visit.         Past Medical History:   Diagnosis Date     Cancer of neck (H)     recent radiation treatment     Chronic pain        Past Surgical History:   Procedure Laterality Date     ARTHROPLASTY REVISION HIP  9/12/2011    Procedure:ARTHROPLASTY REVISION HIP; Left Total Hip Revision, Cup Only; Surgeon:JILL SANDERS; Location:[facility]     ARTHROSCOPY KNEE      right     BACK SURGERY      lower     HERNIA REPAIR       JOINT REPLACEMENT      left       No family history on file.    Review of Systems   Constitutional: Negative.  Negative for chills, fatigue, fever and unexpected weight change.   HENT: Negative.    Eyes: Negative for photophobia, pain and itching.        Positive for watery eyes.   Respiratory: Negative.    Cardiovascular: Negative.    Gastrointestinal: Negative.    Genitourinary: Negative.    Skin:        2 lumps on right arm   Neurological: Negative.  Negative for weakness and numbness.   Psychiatric/Behavioral: Negative.        BP (!) 112/93 (BP Location: Left arm, Patient Position: Fowlers, Cuff Size: Adult Regular)   Pulse 117   Temp 98.6  F (37  C) (Oral)   Resp 20   Wt 112.5 kg (248 lb 0.3 oz)   SpO2 97%   BMI 36.63 kg/m     Physical Exam   Constitutional: He is oriented to person, place, and time. He appears well-developed and well-nourished.   HENT:   Head: Normocephalic and atraumatic.   Eyes: Pupils are equal, round, and reactive to light. Conjunctivae and EOM are normal.   Neck: Normal range of motion. Neck supple.   Cardiovascular: Normal rate, regular rhythm and normal heart sounds.   Pulmonary/Chest: Effort normal and breath sounds normal.   Abdominal: Soft. Bowel sounds are normal.   Musculoskeletal: He exhibits tenderness.   Left hip tenderness over replacement site     Neurological: He is alert and oriented to person, place, and time.   Skin: Skin is warm and dry.   Psychiatric: He has a normal mood and affect. His behavior is normal.         Results for orders placed or performed  in visit on 03/24/19   MR Pelvis (Intrapelvic Organs) wo&w Contrast    Narrative    EXAMINATION: MR PELVIS (INTRAPELVIC ORGANS) WO&W CONTRAST,  3/24/2019 8:21 AM     COMPARISON: PET/CT 2/19/2019    TECHNIQUE:  Images were acquired without and with intravenous contrast  through the pelvis. The following MR images were acquired without  contrast: multiplanar T1, multiplanar T2, axial diffusion-weighted and  axial apparent diffusion coefficient. T1-weighted images with fat  saturation were acquired at the following intervals relative to  intravenous contrast administration: pre-contrast, immediate post  contrast, 1 minute, 3 minutes and delayed.  Contrast dose: 10 mL  Gadavist, 1 mg glucagon    HISTORY: malignancy metastatic schwannoma; Schwannoma of colon    FINDINGS:    In the right ischiorectal fossa, there is a 7.2 x 3.2 cm ovoid  well-circumscribed mass which is predominantly T2 hyperintense, avidly  enhancing, and demonstrates restricted diffusion (series 7 images  18-30). The mass extends into the sciatic notch and potentially arises  from a posterior branch of the sciatic nerve. No involvement of the  anal sphincter complex. Although exact comparison of size from  2/28/2011 is limited due to single axial acquisition from the prior  exam, the mass appears similar in size.    Along the medial margin of the left gluteus daylin muscle, there is a  similar-appearing 2.7 x 1.6 cm ovoid well-circumscribed mass which is  predominantly T2 hyperintense, avidly enhancing, and demonstrates  restricted diffusion (series 8 images 22-25). Although exact  comparison of size from 2/28/2011 is limited due to single axial  acquisition from the prior exam, the mass appears unchanged in size.     Just anterior to the right gluteus musculature on series 23 image 45  and series 7 image 32 there is a nodule measuring 5 mm in short axis  which is enhancing with T2 hyperintensity. Although small, the imaging  characteristics are  similar to the other pelvic masses.    No rectosigmoid tumor. Diverticulosis of the sigmoid colon. The  prostate gland measures 4.2 x 6.0 x 5.4 cm (71 cc). Multifocal  wedge-shaped T2 hypointensity throughout the peripheral zone without  focal nodularity. No free fluid or suspicious lymphadenopathy.  Bilateral hip arthroplasties.      Impression    IMPRESSION:   1. In the right ischiorectal fossa extending to the sciatic notch,  there is a 7.2 cm well-circumscribed mass. Per chart review, this mass  was biopsied on 2/28/2011 with pathology results of a schwannoma.  Although exact comparison of size from 2/28/2011 is limited due to  single axial acquisition from the prior exam, the mass appears similar  in size.  2. Along the medial margin of the left gluteus daylin muscle, there  is a 2.7 cm well-circumscribed mass. Per chart review, this mass was  biopsied 2/21/2019 with pathology results of a spindle cell tumor.  Although exact comparison of size from 2/28/2011 is limited due to  single axial acquisition from the prior exam, the mass appears  unchanged in size.  3. 5 mm nodule anterior to the right gluteus daylin musculature could  represent a 3rd nerve sheath tumor other differential also includes  enhancing lymph node.  4. Prostatomegaly.    I have personally reviewed the examination and initial interpretation  and I agree with the findings.    ZEKE PERRY MD         Impression:  1. Schwannomas - MRI shows several lesions in pelvic area including one heterogeneous larger lesion in right ischiorectal fossa concerning for malignant degeneration. Differential diagnosis includes schwannomatosis and NF2.    Plan:  1. RTC for PET scan in Late July.  2. Schedule surgery to remove pelvic lesion with known cellular atypia and heterogeneity, as well as left gluteal schwannoma.  3. Genetic testing for NF2 to assess risk for family members, pending insurance approval.  4. MRI brain, spine, and pelvis to assess for  other schwannomas or characteristic lesions of NF2..          Gregorio Pack    CC  Patient Care Team:  Johnny Rausch MD as PCP - General  COLLEEN ROME    Copy to patient  RAN HOLMAN  [address]  [facility] [address]    Thank you for choosing [facility]!   It was a pleasure to see you in our Neurofibromatosis Clinic today.  [email or web address]    Here's our recommendations for follow-up care:    Referrals/Tests/Plan:    Please see your dermatologist for follow-up of moles, especially the one on your right cheek.    PET scan and MRI scans (Brain/IACs, spine and pelvis) and return to see Dr. Pack.  Our  will arrange these appts and contact you with the details.    Call if you develop any of the following:    Rapidly growing or painful lump    New or worsening pain, numbness, tingling or weakness    Any other new or concerning symptom you would like to discuss    ------------------------------------------------------------------------------------------------------------------------------    Neurofibromatosis (NF) Clinic  [facility], 9th Floor - [address] [address]  Fax: [phone]   Scheduling/Appointments: [phone]  Jerilyn BIRMINGHAM   Phone: [phone]   Services: [phone]   Infusion Center/Lab: [phone]   Radiology/[phone] (Pediatrics) / [phone] (Adults)  Pediatric Specialty Call Center: [phone]  Adult Specialty Call Center: [phone]     Concerns or questions for your care team:  Monday - Friday, 8:00 am - 5:00 pm:    Non-urgent concerns: Voicemail: [phone]    Urgent concerns: [facility]: [phone].  Nights and weekends:   Call [phone] and ask the  to page the 'Pediatric Hematology/Oncology fellow on call' if you have  an urgent concern that can't wait until the clinic opens.    ------------------------------------------------------------------------------------------------------------------------------    Neurofibromatosis Team  Gregorio Pack MD - Director, Neurofibromatosis/Pediatric Neuro-Oncology  Francia Gill MD - Pediatric Genetics  Mono Allan MD - Pediatric Genetics  Carmen Gonzlaes, CNP, APRN - Pediatric Neuro-Oncology  Barbara Singh MS, RN - NF Care Coordinator  Voicemail: [phone]  Pager: [phone]  E-mail: [email or web address]  Hoda Winslow RN - Tumor Care Coordinator     Voicemail: [phone]  TAMMY Damon, SW -      Phone: [phone]  Magdalena Trivedi CGC - Genetic Counselor  Phone: [phone]  Jerilyn Garcia - SchedulerPhone: [phone]      Gregorio Pack MD

## 2019-06-05 NOTE — LETTER
6/5/2019      RE: Walt Jennings  [address]  [place] [address]          Pediatric Hematology/Oncology Clinic Note       HPI-  Walt Jennings is a 74 year old male with pelvic schwannomas schwannoma who presents to the clinic for a consultation as requested by Dr. Gold regarding possible Schwannomatosis. Patient was first found to have incidental finding in right pelvis on whole body imaging after diagnosis of throat cancer in 2011. Biopsy found the lesion to be a schwannoma, but no treatment was started due to patient remaining asymptomatic.    Walt presented in February 2019 with one day of nausea, vomiting, diarrhea and frequent urination shortly after returning from [place]. Imaging during hospitalization found 2 additional small masses close by. PET scan showed focal atypia. MRI on 3/24/19 and follow-up biopsy identified the same schwannoma from 2011, a spindle cell tumor, and a 5 mm nodule near the R sciatic nerve.    Since then, Walt has remained asymptomatic. He has residual left hip pain from a hip replacement that was followed by a  revision. He ambulates without pain when he uses a cane, and often neglects to use the cane as well. He denies weight change, fatigue, night sweats, headaches, numbness/tingling, or any other pain.     Fam/Soc: Walt is a retired , who lives with his wife. He is an avid fisherman and travels frequently to go fishing. There is no history of neurofibromatosis in his family.    Walt has a history of melanoma s/p surgical removal in 2010, throat cancer s/p surgery and radiation in 2011 and left hip replacement.    History was obtained from the patient.       Allergies   Allergen Reactions     Percocet [Oxycodone-Acetaminophen] Diarrhea       Current Outpatient Medications   Medication     ibuprofen (IBU-200) 200 MG tablet     sildenafil (VIAGRA) 50 MG tablet     No current facility-administered medications for this visit.         Past Medical History:   Diagnosis Date     Cancer of neck (H)     recent radiation treatment     Chronic pain        Past Surgical History:   Procedure Laterality Date     ARTHROPLASTY REVISION HIP  9/12/2011    Procedure:ARTHROPLASTY REVISION HIP; Left Total Hip Revision, Cup Only; Surgeon:JILL SANDERS; Location:[facility] OR     ARTHROSCOPY KNEE      right     BACK SURGERY      lower     HERNIA REPAIR       JOINT REPLACEMENT      left       No family history on file.    Review of Systems   Constitutional: Negative.  Negative for chills, fatigue, fever and unexpected weight change.   HENT: Negative.    Eyes: Negative for photophobia, pain and itching.        Positive for watery eyes.   Respiratory: Negative.    Cardiovascular: Negative.    Gastrointestinal: Negative.    Genitourinary: Negative.    Skin:        2 lumps on right arm   Neurological: Negative.  Negative for weakness and numbness.   Psychiatric/Behavioral: Negative.        BP (!) 112/93 (BP Location: Left arm, Patient Position: Fowlers, Cuff Size: Adult Regular)   Pulse 117   Temp 98.6  F (37  C) (Oral)   Resp 20   Wt 112.5 kg (248 lb 0.3 oz)   SpO2 97%   BMI 36.63 kg/m     Physical Exam   Constitutional: He is oriented to person, place, and time. He appears well-developed and well-nourished.   HENT:   Head: Normocephalic and atraumatic.   Eyes: Pupils are equal, round, and reactive to light. Conjunctivae and EOM are normal.   Neck: Normal range of motion. Neck supple.   Cardiovascular: Normal rate, regular rhythm and normal heart sounds.   Pulmonary/Chest: Effort normal and breath sounds normal.   Abdominal: Soft. Bowel sounds are normal.   Musculoskeletal: He exhibits tenderness.   Left hip tenderness over replacement site     Neurological: He is alert and oriented to person, place, and time.   Skin: Skin is warm and dry.   Psychiatric: He has a normal mood and affect. His behavior is normal.         Results for orders placed or performed  in visit on 03/24/19   MR Pelvis (Intrapelvic Organs) wo&w Contrast    Narrative    EXAMINATION: MR PELVIS (INTRAPELVIC ORGANS) WO&W CONTRAST,  3/24/2019 8:21 AM     COMPARISON: PET/CT 2/19/2019    TECHNIQUE:  Images were acquired without and with intravenous contrast  through the pelvis. The following MR images were acquired without  contrast: multiplanar T1, multiplanar T2, axial diffusion-weighted and  axial apparent diffusion coefficient. T1-weighted images with fat  saturation were acquired at the following intervals relative to  intravenous contrast administration: pre-contrast, immediate post  contrast, 1 minute, 3 minutes and delayed.  Contrast dose: 10 mL  Gadavist, 1 mg glucagon    HISTORY: malignancy metastatic schwannoma; Schwannoma of colon    FINDINGS:    In the right ischiorectal fossa, there is a 7.2 x 3.2 cm ovoid  well-circumscribed mass which is predominantly T2 hyperintense, avidly  enhancing, and demonstrates restricted diffusion (series 7 images  18-30). The mass extends into the sciatic notch and potentially arises  from a posterior branch of the sciatic nerve. No involvement of the  anal sphincter complex. Although exact comparison of size from  2/28/2011 is limited due to single axial acquisition from the prior  exam, the mass appears similar in size.    Along the medial margin of the left gluteus daylin muscle, there is a  similar-appearing 2.7 x 1.6 cm ovoid well-circumscribed mass which is  predominantly T2 hyperintense, avidly enhancing, and demonstrates  restricted diffusion (series 8 images 22-25). Although exact  comparison of size from 2/28/2011 is limited due to single axial  acquisition from the prior exam, the mass appears unchanged in size.     Just anterior to the right gluteus musculature on series 23 image 45  and series 7 image 32 there is a nodule measuring 5 mm in short axis  which is enhancing with T2 hyperintensity. Although small, the imaging  characteristics are  similar to the other pelvic masses.    No rectosigmoid tumor. Diverticulosis of the sigmoid colon. The  prostate gland measures 4.2 x 6.0 x 5.4 cm (71 cc). Multifocal  wedge-shaped T2 hypointensity throughout the peripheral zone without  focal nodularity. No free fluid or suspicious lymphadenopathy.  Bilateral hip arthroplasties.      Impression    IMPRESSION:   1. In the right ischiorectal fossa extending to the sciatic notch,  there is a 7.2 cm well-circumscribed mass. Per chart review, this mass  was biopsied on 2/28/2011 with pathology results of a schwannoma.  Although exact comparison of size from 2/28/2011 is limited due to  single axial acquisition from the prior exam, the mass appears similar  in size.  2. Along the medial margin of the left gluteus daylin muscle, there  is a 2.7 cm well-circumscribed mass. Per chart review, this mass was  biopsied 2/21/2019 with pathology results of a spindle cell tumor.  Although exact comparison of size from 2/28/2011 is limited due to  single axial acquisition from the prior exam, the mass appears  unchanged in size.  3. 5 mm nodule anterior to the right gluteus daylin musculature could  represent a 3rd nerve sheath tumor other differential also includes  enhancing lymph node.  4. Prostatomegaly.    I have personally reviewed the examination and initial interpretation  and I agree with the findings.    ZEKE PERRY MD         Impression:  1. Schwannomas - MRI shows several lesions in pelvic area including one heterogeneous larger lesion in right ischiorectal fossa concerning for malignant degeneration. Differential diagnosis includes schwannomatosis and NF2.    Plan:  1. RTC for PET scan in Late July.  2. Schedule surgery to remove pelvic lesion with known cellular atypia and heterogeneity, as well as left gluteal schwannoma.  3. Genetic testing for NF2 to assess risk for family members, pending insurance approval.  4. MRI brain, spine, and pelvis to assess for  other schwannomas or characteristic lesions of NF2..          Gregorio Pack    CC  Patient Care Team:  Johnny Rausch MD as PCP - General  COLLEEN ROME    Copy to patient  RAN HOLMAN  [address]  [facility] [address]    Thank you for choosing [facility]!   It was a pleasure to see you in our Neurofibromatosis Clinic today.  [email or web address]    Here's our recommendations for follow-up care:    Referrals/Tests/Plan:    Please see your dermatologist for follow-up of moles, especially the one on your right cheek.    PET scan and MRI scans (Brain/IACs, spine and pelvis) and return to see Dr. Pack.  Our  will arrange these appts and contact you with the details.    Call if you develop any of the following:    Rapidly growing or painful lump    New or worsening pain, numbness, tingling or weakness    Any other new or concerning symptom you would like to discuss    ------------------------------------------------------------------------------------------------------------------------------    Neurofibromatosis (NF) Clinic  [facility], 9th Floor - [address] [address]  Fax: [phone]   Scheduling/Appointments: [phone]  Jerilyn BIRMINGHAM   Phone: [phone]   Services: [phone]   Infusion Center/Lab: [phone]   Radiology/[phone] (Pediatrics) / [phone] (Adults)  Pediatric Specialty Call Center: [phone]  Adult Specialty Call Center: [phone]     Concerns or questions for your care team:  Monday - Friday, 8:00 am - 5:00 pm:    Non-urgent concerns: Voicemail: [phone]    Urgent concerns: [facility]: [phone].  Nights and weekends:   Call [phone] and ask the  to page the 'Pediatric Hematology/Oncology fellow on call' if you have  an urgent concern that can't wait until the clinic opens.    ------------------------------------------------------------------------------------------------------------------------------    Neurofibromatosis Team  Gregorio Pack MD - Director, Neurofibromatosis/Pediatric Neuro-Oncology  Francia Gill MD - Pediatric Genetics  Mono Allan MD - Pediatric Genetics  Carmen Gonzales, CNP, APRN - Pediatric Neuro-Oncology  Barbara Singh MS, RN - NF Care Coordinator  Voicemail: [phone]  Pager: [phone]  E-mail: [email or web address]  Hoda Winslow RN - Tumor Care Coordinator     Voicemail: [phone]  TAMMY Damon, SW -      Phone: [phone]  Magdalena Trivedi CGC - Genetic Counselor  Phone: [phone]  Jerilyn Garcia - SchedulerPhone: [phone]      Gregorio Pack MD

## 2019-06-05 NOTE — LETTER
2019      RE: Walt Jennings  [address] [place] [address]       Presenting information: Walt is a 74 year old male with a history of schwannomas. He was referred for a genetics evaluation by Dr. Gold and evaluated by Dr. Pack today.   I met with Walt at the request of Dr. Pack to obtain a personal and family history, discuss possible genetic contributions to his symptoms, and to obtain informed consent for genetic testing if indicated.     Personal History:  Walt has a history of a perirectal mass in  showing peripheral nerve shealth tumor consistent with schwannoma. An MRI of his spine showed several lumbar intrathecal masses that could also be schwannomas. He has a history of melanoma on his chest, lipomas, and throat cancer. He denies a history of known tumors in his ears, significant hearing loss, loss of balance, tinnitus, or ocular concerns. He denies a history of seizure or vascular concerns. He has had polyps on prior colonoscopies, but does not remember how many or what type. See Dr. Pack's note for additional details.     Family History: A three generation pedigree was obtained today and scanned into the EMR. The following information is significant:  - Children: 48yo daughter with back issues after a broken back in childhood. 48yo and 42yo sons with no noted health concerns.  - Siblings: 72yo brother with diabetes and multiple lipomas. 68yo brother with one or two lipomas. No concerns noted for nieces or nephews.   - Maternal: Mother  at 83y from pneumonia; history of lung cancer. Six maternal uncles all thought to have lipomas. No additional tumors/cancers known for maternal uncles/aunts. Limited information known for maternal cousins but no tumors/cancer known. Grandmother  in her 60s from a fall. Grandfather  in his 60s from unknown cause.  - Paternal: Father  at 85y from Alzheimer's disease diagnosed in his 80s. Two paternal aunts   "from Alzheimer's disease diagnosed in their 70s. No cancers/tumors noted for paternal aunts or cousins.  Grandmother  around 63y from cancer- type unknown. Grandfather  at 38y from \"something with his stomach.\"  - Ancestry: maternal- Scandinavian/Polish; paternal- [place]ian; consanguinity was denied.    Discussion:    Schwannomas are tumors that grow on the peripheral nerves throughout the body and usually occurs in individuals over the age of 30 years. Schwannomatosis is a condition associated with at least two separate schwannomas and lack of a  schwannoma in the ear (vestibular schwannoma). Other features include pain, numbness and pain associated with the schwannomas.    Individuals who have an inherited type of schwannomatosis have a 50% chance of passing this condition on to each of their children. It is inherited in a dominant pattern in families.     The symptoms may be different in different relatives (variable expression) or a very few symptoms (reduced penetrance).     Schwannomatosis can also be segmental, meaning that it occurs only in one part of the body.    After physical examination, Dr. Pack is not recommending genetic testing for Walt at this time. He would like Walt to have additional MRIs of the brain, spine and pelvis to further assess for NF2. Walt was also given a brochure on NF2 and was encouraged to talk to his children to see how they feel about him having testing.     Plan:  1. Genetic testing not pursued at this time. Follow-up MRIs and Walt was encouraged to talk to his family members about testing prior to making a decision.   2. Return  per Dr. Pack' recommendation.     Magdalena Trivedi MS, [facility]  Licensed Genetic Counselor  [phone]    Approximate Time Spent in Consultation: 30 minutes     CC: no letter    Magdalena Trivedi,   "

## 2019-06-12 PROBLEM — D36.10 NEURILEMMOMA: Status: ACTIVE | Noted: 2019-06-12

## 2019-06-12 NOTE — PATIENT INSTRUCTIONS
Thank you for choosing [facility]!   It was a pleasure to see you in our Neurofibromatosis Clinic today.  [email or web address]    Here's our recommendations for follow-up care:    Referrals/Tests/Plan:    Please see your dermatologist for follow-up of moles, especially the one on your right cheek.    PET scan and MRI scans (Brain/IACs, spine and pelvis) and return to see Dr. Pack.  Our  will arrange these appts and contact you with the details.    Call if you develop any of the following:    Rapidly growing or painful lump    New or worsening pain, numbness, tingling or weakness    Any other new or concerning symptom you would like to discuss    ------------------------------------------------------------------------------------------------------------------------------    Neurofibromatosis (NF) Clinic  [facility], 9th Floor - [address] [address]  Fax: [phone]   Scheduling/Appointments: [phone]  Jerilyn BIRM[place]HAM   Phone: [phone]   Services: [phone]   Infusion Center/Lab: [phone]   Radiology/[phone] (Pediatrics) / [phone] (Adults)  Pediatric Specialty Call Center: [phone]  Adult Specialty Call Center: [phone]     Concerns or questions for your care team:  Monday - Friday, 8:00 am - 5:00 pm:    Non-urgent concerns: Voicemail: [phone]    Urgent concerns: [facility]: [phone].  Nights and weekends:   Call [phone] and ask the  to page the 'Pediatric Hematology/Oncology fellow on call' if you have an urgent concern that can't wait until the clinic opens.    ------------------------------------------------------------------------------------------------------------------------------    Neurofibromatosis Team  Gregorio Pack MD - Director,  Neurofibromatosis/Pediatric Neuro-Oncology  Francia Gill MD - Pediatric Genetics  Mono Allan MD - Pediatric Genetics  Carmen Gonzales, CNP, APRN - Pediatric Neuro-Oncology  Barbara Singh MS, RN - NF Care Coordinator  Voicemail: [phone]  Pager: [phone]  E-mail: [email or web address]  Hoda Winslow RN - Tumor Care Coordinator     Voicemail: [phone]  TAMMY Damon, [facility] -      Phone: [phone]  Magdalena Trivedi [facility] [place] [facility] - Genetic Counselor  Phone: [phone]  Jerilyn Garcia -   Phone: [phone]

## 2019-07-24 ENCOUNTER — ANCILLARY PROCEDURE (OUTPATIENT)
Dept: MRI IMAGING | Facility: CLINIC | Age: 74
End: 2019-07-24
Attending: PEDIATRICS
Payer: COMMERCIAL

## 2019-07-24 ENCOUNTER — HOSPITAL ENCOUNTER (OUTPATIENT)
Dept: PET IMAGING | Facility: CLINIC | Age: 74
End: 2019-07-24
Attending: PEDIATRICS
Payer: COMMERCIAL

## 2019-07-24 ENCOUNTER — HOSPITAL ENCOUNTER (OUTPATIENT)
Dept: PET IMAGING | Facility: CLINIC | Age: 74
Discharge: HOME | End: 2019-07-24
Attending: PEDIATRICS | Admitting: PEDIATRICS
Payer: COMMERCIAL

## 2019-07-24 DIAGNOSIS — C43.59 MALIGNANT MELANOMA OF SKIN OF TRUNK (H): ICD-10-CM

## 2019-07-24 DIAGNOSIS — D36.10 SCHWANNOMA: ICD-10-CM

## 2019-07-24 DIAGNOSIS — C44.92 SQUAMOUS CELL CARCINOMA OF SKIN: ICD-10-CM

## 2019-07-24 LAB — GLUCOSE BLDC GLUCOMTR-MCNC: 97 MG/DL (ref 70–99)

## 2019-07-24 PROCEDURE — A9552 F18 FDG: HCPCS | Performed by: PEDIATRICS

## 2019-07-24 PROCEDURE — 34300033 ZZH RX 343: Performed by: PEDIATRICS

## 2019-07-24 PROCEDURE — 70491 CT SOFT TISSUE NECK W/DYE: CPT

## 2019-07-24 PROCEDURE — 78816 PET IMAGE W/CT FULL BODY: CPT | Mod: PS

## 2019-07-24 PROCEDURE — 71260 CT THORAX DX C+: CPT | Mod: PS

## 2019-07-24 PROCEDURE — 25000128 H RX IP 250 OP 636: Performed by: PEDIATRICS

## 2019-07-24 PROCEDURE — 82962 GLUCOSE BLOOD TEST: CPT

## 2019-07-24 RX ORDER — GADOBUTROL 604.72 MG/ML
10 INJECTION INTRAVENOUS ONCE
Status: COMPLETED | OUTPATIENT
Start: 2019-07-24 | End: 2019-07-24

## 2019-07-24 RX ORDER — IOPAMIDOL 755 MG/ML
45-135 INJECTION, SOLUTION INTRAVASCULAR ONCE
Status: COMPLETED | OUTPATIENT
Start: 2019-07-24 | End: 2019-07-24

## 2019-07-24 RX ADMIN — FLUDEOXYGLUCOSE F-18 14.89 MCI.: 500 INJECTION, SOLUTION INTRAVENOUS at 12:00

## 2019-07-24 RX ADMIN — GADOBUTROL 10 ML: 604.72 INJECTION INTRAVENOUS at 16:29

## 2019-07-24 RX ADMIN — IOPAMIDOL 135 ML: 755 INJECTION, SOLUTION INTRAVENOUS at 12:55

## 2019-07-24 NOTE — DISCHARGE INSTRUCTIONS
MRI Contrast Discharge Instructions    The IV contrast you received today will pass out of your body in your  urine. This will happen in the next 24 hours. You will not feel this process.  Your urine will not change color.    Drink at least 4 extra glasses of water or juice today (unless your doctor  has restricted your fluids). This reduces the stress on your kidneys.  You may take your regular medicines.    If you are on dialysis: It is best to have dialysis today.    If you have a reaction: Most reactions happen right away. If you have  any new symptoms after leaving the hospital (such as hives or swelling),  call your hospital at the correct number below. Or call your family doctor.  If you have breathing distress or wheezing, call 911.    Special instructions: ***    I have read and understand the above information.    Signature:______________________________________ Date:___________    Staff:__________________________________________ Date:___________     Time:__________    [place] Radiology Departments:    ___[place]: [phone]  ___[facility]: [phone]  ___[place]: [phone] ___[facility]: [phone]  ___[facility]: [phone]  ___[facility]: [phone]  ___[phone]  ___[facility]: [phone]  ___[phone]

## 2019-07-24 NOTE — DISCHARGE INSTRUCTIONS
MRI Contrast Discharge Instructions    The IV contrast you received today will pass out of your body in your  urine. This will happen in the next 24 hours. You will not feel this process.  Your urine will not change color.    Drink at least 4 extra glasses of water or juice today (unless your doctor  has restricted your fluids). This reduces the stress on your kidneys.  You may take your regular medicines.    If you are on dialysis: It is best to have dialysis today.    If you have a reaction: Most reactions happen right away. If you have  any new symptoms after leaving the hospital (such as hives or swelling),  call your hospital at the correct number below. Or call your family doctor.  If you have breathing distress or wheezing, call 911.    Special instructions: ***    I have read and understand the above information.    Signature:______________________________________ Date:___________    Staff:__________________________________________ Date:___________     Time:__________    [place] Radiology Departments:    ___[place]: [phone]  ___[facility]: [phone]  ___[place]: [phone] ___[facility]: [phone]  ___[facility]: [phone]  ___[facility]: [phone]  ___[phone]  ___[facility]: [phone]  ___[phone]

## 2019-07-24 NOTE — DISCHARGE INSTRUCTIONS
MRI Contrast Discharge Instructions    The IV contrast you received today will pass out of your body in your  urine. This will happen in the next 24 hours. You will not feel this process.  Your urine will not change color.    Drink at least 4 extra glasses of water or juice today (unless your doctor  has restricted your fluids). This reduces the stress on your kidneys.  You may take your regular medicines.    If you are on dialysis: It is best to have dialysis today.    If you have a reaction: Most reactions happen right away. If you have  any new symptoms after leaving the hospital (such as hives or swelling),  call your hospital at the correct number below. Or call your family doctor.  If you have breathing distress or wheezing, call 911.    Special instructions: ***    I have read and understand the above information.    Signature:______________________________________ Date:___________    Staff:__________________________________________ Date:___________     Time:__________    [place] Radiology Departments:    ___[place]: [phone]  ___[facility]: [phone]  ___[place]: [phone] ___[facility]: [phone]  ___[facility]: [phone]  ___[facility]: [phone]  ___[phone]  ___[facility]: [phone]  ___[phone]

## 2019-07-26 ENCOUNTER — ANCILLARY PROCEDURE (OUTPATIENT)
Dept: MRI IMAGING | Facility: CLINIC | Age: 74
End: 2019-07-26
Attending: PEDIATRICS
Payer: COMMERCIAL

## 2019-07-26 ENCOUNTER — OFFICE VISIT (OUTPATIENT)
Dept: PEDIATRIC HEMATOLOGY/ONCOLOGY | Facility: CLINIC | Age: 74
End: 2019-07-26
Attending: PEDIATRICS
Payer: COMMERCIAL

## 2019-07-26 VITALS
WEIGHT: 242.95 LBS | RESPIRATION RATE: 18 BRPM | OXYGEN SATURATION: 96 % | DIASTOLIC BLOOD PRESSURE: 97 MMHG | HEART RATE: 83 BPM | SYSTOLIC BLOOD PRESSURE: 144 MMHG | TEMPERATURE: 97.4 F | BODY MASS INDEX: 35.88 KG/M2

## 2019-07-26 DIAGNOSIS — C43.59 MALIGNANT MELANOMA OF SKIN OF TRUNK (H): ICD-10-CM

## 2019-07-26 DIAGNOSIS — D36.10 SCHWANNOMA: Primary | ICD-10-CM

## 2019-07-26 DIAGNOSIS — C44.92 SQUAMOUS CELL CARCINOMA OF SKIN: ICD-10-CM

## 2019-07-26 DIAGNOSIS — D36.10 SCHWANNOMA: ICD-10-CM

## 2019-07-26 PROCEDURE — G0463 HOSPITAL OUTPT CLINIC VISIT: HCPCS | Mod: ZF

## 2019-07-26 RX ORDER — GADOBUTROL 604.72 MG/ML
10 INJECTION INTRAVENOUS ONCE
Status: COMPLETED | OUTPATIENT
Start: 2019-07-26 | End: 2019-07-26

## 2019-07-26 RX ADMIN — GADOBUTROL 10 ML: 604.72 INJECTION INTRAVENOUS at 10:34

## 2019-07-26 ASSESSMENT — PAIN SCALES - GENERAL: PAINLEVEL: NO PAIN (0)

## 2019-07-26 NOTE — DISCHARGE INSTRUCTIONS
MRI Contrast Discharge Instructions    The IV contrast you received today will pass out of your body in your  urine. This will happen in the next 24 hours. You will not feel this process.  Your urine will not change color.    Drink at least 4 extra glasses of water or juice today (unless your doctor  has restricted your fluids). This reduces the stress on your kidneys.  You may take your regular medicines.    If you are on dialysis: It is best to have dialysis today.    If you have a reaction: Most reactions happen right away. If you have  any new symptoms after leaving the hospital (such as hives or swelling),  call your hospital at the correct number below. Or call your family doctor.  If you have breathing distress or wheezing, call 911.    Special instructions: ***    I have read and understand the above information.    Signature:______________________________________ Date:___________    Staff:__________________________________________ Date:___________     Time:__________    [place] Radiology Departments:    ___[place]: [phone]  ___[facility]: [phone]  ___[place]: [phone] ___[facility]: [phone]  ___[facility]: [phone]  ___[facility]: [phone]  ___[phone]  ___[facility]: [phone]  ___[phone]

## 2019-07-26 NOTE — NURSING NOTE
Chief Complaint   Patient presents with     RECHECK     Patient here today for follow up with NF2     BP (!) 144/97 (BP Location: Right arm, Patient Position: Fowlers, Cuff Size: Adult Large)   Pulse 83   Temp 97.4  F (36.3  C) (Oral)   Resp 18   Wt 110.2 kg (242 lb 15.2 oz)   SpO2 96%   BMI 35.88 kg/m    Saba Acuna CMA  July 26, 2019

## 2019-07-26 NOTE — DISCHARGE INSTRUCTIONS
MRI Contrast Discharge Instructions    The IV contrast you received today will pass out of your body in your  urine. This will happen in the next 24 hours. You will not feel this process.  Your urine will not change color.    Drink at least 4 extra glasses of water or juice today (unless your doctor  has restricted your fluids). This reduces the stress on your kidneys.  You may take your regular medicines.    If you are on dialysis: It is best to have dialysis today.    If you have a reaction: Most reactions happen right away. If you have  any new symptoms after leaving the hospital (such as hives or swelling),  call your hospital at the correct number below. Or call your family doctor.  If you have breathing distress or wheezing, call 911.    Special instructions: ***    I have read and understand the above information.    Signature:______________________________________ Date:___________    Staff:__________________________________________ Date:___________     Time:__________    [facility] Departments:    ___[place]: [phone]  ___[facility]: [phone]  ___[place]: [phone] ___[facility]: [phone]  ___[facility]: [phone]  ___[facility]: [phone]  ___[phone]  ___[facility]: [phone]  ___[phone]

## 2019-07-26 NOTE — LETTER
7/26/2019      RE: Walt Jennings  [address] [place] [address]       Mr. Jennings comes in today to review imaging obtained to evaluate for possible schwannomatosis vs. NF2 and also to evaluate his known perirectal masses.    He initially underwent head MRI. This revealed that he has a small unilateral schwannoma on his right CN VIII just proximal to the cochlea          A whole body FDG PET scan was also obtained. This reveals little to no change in the large pelvic schwannomas, although SUV has increased somewhat, but not beyond the threshold for malignant transition.    MRI of the spine revealed multiple soft tissue and nerve root lesions with the typical appearance of schwannomas.    Likewise, MRI of the spine reveals post operative changes in the lumbar spine. MRI of the cervical spine reveals spondylosis and severe stenosis at C3-4 without evidence of myelopathy.            Mr. Jennings denies any neck pain, shoulder/arm weakness or changes in sensation.       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 Stephan Lyons re: cervical spine evaluation and possible intervention.  I strongly advised against chiropractic manipulation.  Medicare is likely to not reimburse NF2 testing. This may be done if schwannomas are resected, but none are asymptomatic or suspicious for malignancy at this time.  Mr. Jennings is advised to  his children re: appearance of new masses, deafness or vertigo. These should be investigated appropriately in light of possible NF2.      Total time of 40 minutes today includes > 50% time in counseling reviewing imaging, possible genetics of NF2 and care plan - and fishing!    MD Gregorio Lerner MD

## 2019-07-26 NOTE — LETTER
7/26/2019      RE: Walt Jennings  [address] [place] [address]       Mr. Jennings comes in today to review imaging obtained to evaluate for possible schwannomatosis vs. NF2 and also to evaluate his known perirectal masses.    He initially underwent head MRI. This revealed that he has a small unilateral schwannoma on his right CN VIII just proximal to the cochlea          A whole body FDG PET scan was also obtained. This reveals little to no change in the large pelvic schwannomas, although SUV has increased somewhat, but not beyond the threshold for malignant transition.    MRI of the spine revealed multiple soft tissue and nerve root lesions with the typical appearance of schwannomas.    Likewise, MRI of the spine reveals post operative changes in the lumbar spine. MRI of the cervical spine reveals spondylosis and severe stenosis at C3-4 without evidence of myelopathy.            Mr. Jennings denies any neck pain, shoulder/arm weakness or changes in sensation.       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 Stephan Lyons re: cervical spine evaluation and possible intervention.  I strongly advised against chiropractic manipulation.  Medicare is likely to not reimburse NF2 testing. This may be done if schwannomas are resected, but none are asymptomatic or suspicious for malignancy at this time.  Mr. Jennings is advised to  his children re: appearance of new masses, deafness or vertigo. These should be investigated appropriately in light of possible NF2.      Total time of 40 minutes today includes > 50% time in counseling reviewing imaging, possible genetics of NF2 and care plan - and fishing!    Gregorio Pack MD

## 2019-07-26 NOTE — PROGRESS NOTES
Mr. Jennings comes in today to review imaging obtained to evaluate for possible schwannomatosis vs. NF2 and also to evaluate his known perirectal masses.    He initially underwent head MRI. This revealed that he has a small unilateral schwannoma on his right CN VIII just proximal to the cochlea          A whole body FDG PET scan was also obtained. This reveals little to no change in the large pelvic schwannomas, although SUV has increased somewhat, but not beyond the threshold for malignant transition.    MRI of the spine revealed multiple soft tissue and nerve root lesions with the typical appearance of schwannomas.    Likewise, MRI of the spine reveals post operative changes in the lumbar spine. MRI of the cervical spine reveals spondylosis and severe stenosis at C3-4 without evidence of myelopathy.            Mr. Jennings denies any neck pain, shoulder/arm weakness or changes in sensation.       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 Stephan Lyons re: cervical spine evaluation and possible intervention.  I strongly advised against chiropractic manipulation.  Medicare is likely to not reimburse NF2 testing. This may be done if schwannomas are resected, but none are asymptomatic or suspicious for malignancy at this time.  Mr. Jennings is advised to  his children re: appearance of new masses, deafness or vertigo. These should be investigated appropriately in light of possible NF2.      Total time of 40 minutes today includes > 50% time in counseling reviewing imaging, possible genetics of NF2 and care plan - and fishing!    Gregorio Pack MD

## 2019-11-06 ENCOUNTER — HEALTH MAINTENANCE LETTER (OUTPATIENT)
Age: 74
End: 2019-11-06

## 2019-12-24 ENCOUNTER — RECORDS - HEALTHEAST (OUTPATIENT)
Dept: LAB | Facility: CLINIC | Age: 74
End: 2019-12-24

## 2019-12-24 LAB
ALBUMIN SERPL-MCNC: 4 G/DL (ref 3.5–5)
ALP SERPL-CCNC: 60 U/L (ref 45–120)
ALT SERPL W P-5'-P-CCNC: 12 U/L (ref 0–45)
ANION GAP SERPL CALCULATED.3IONS-SCNC: 8 MMOL/L (ref 5–18)
AST SERPL W P-5'-P-CCNC: 19 U/L (ref 0–40)
BASOPHILS # BLD AUTO: 0 THOU/UL (ref 0–0.2)
BASOPHILS NFR BLD AUTO: 1 % (ref 0–2)
BILIRUB SERPL-MCNC: 1.2 MG/DL (ref 0–1)
BUN SERPL-MCNC: 23 MG/DL (ref 8–28)
CALCIUM SERPL-MCNC: 9.8 MG/DL (ref 8.5–10.5)
CHLORIDE BLD-SCNC: 107 MMOL/L (ref 98–107)
CHOLEST SERPL-MCNC: 181 MG/DL
CO2 SERPL-SCNC: 27 MMOL/L (ref 22–31)
CREAT SERPL-MCNC: 1.09 MG/DL (ref 0.7–1.3)
CRP SERPL HS-MCNC: 2.6 MG/L (ref 0–3)
EOSINOPHIL # BLD AUTO: 0.2 THOU/UL (ref 0–0.4)
EOSINOPHIL NFR BLD AUTO: 2 % (ref 0–6)
ERYTHROCYTE [DISTWIDTH] IN BLOOD BY AUTOMATED COUNT: 11.9 % (ref 11–14.5)
ERYTHROCYTE [SEDIMENTATION RATE] IN BLOOD BY WESTERGREN METHOD: 5 MM/HR (ref 0–15)
FASTING STATUS PATIENT QL REPORTED: ABNORMAL
GFR SERPL CREATININE-BSD FRML MDRD: >60 ML/MIN/1.73M2
GLUCOSE BLD-MCNC: 97 MG/DL (ref 70–125)
HCT VFR BLD AUTO: 44.5 % (ref 40–54)
HDLC SERPL-MCNC: 38 MG/DL
HGB BLD-MCNC: 14.6 G/DL (ref 14–18)
LDLC SERPL CALC-MCNC: 125 MG/DL
LYMPHOCYTES # BLD AUTO: 1.3 THOU/UL (ref 0.8–4.4)
LYMPHOCYTES NFR BLD AUTO: 16 % (ref 20–40)
MCH RBC QN AUTO: 31.3 PG (ref 27–34)
MCHC RBC AUTO-ENTMCNC: 32.8 G/DL (ref 32–36)
MCV RBC AUTO: 96 FL (ref 80–100)
MONOCYTES # BLD AUTO: 0.9 THOU/UL (ref 0–0.9)
MONOCYTES NFR BLD AUTO: 11 % (ref 2–10)
NEUTROPHILS # BLD AUTO: 5.8 THOU/UL (ref 2–7.7)
NEUTROPHILS NFR BLD AUTO: 70 % (ref 50–70)
PLATELET # BLD AUTO: 190 THOU/UL (ref 140–440)
PMV BLD AUTO: 10.8 FL (ref 8.5–12.5)
POTASSIUM BLD-SCNC: 5.3 MMOL/L (ref 3.5–5)
PROT SERPL-MCNC: 6.4 G/DL (ref 6–8)
RBC # BLD AUTO: 4.66 MILL/UL (ref 4.4–6.2)
SODIUM SERPL-SCNC: 142 MMOL/L (ref 136–145)
TRIGL SERPL-MCNC: 89 MG/DL
TSH SERPL DL<=0.005 MIU/L-ACNC: 1.76 UIU/ML (ref 0.3–5)
VIT B12 SERPL-MCNC: 455 PG/ML (ref 213–816)
WBC: 8.3 THOU/UL (ref 4–11)

## 2020-02-16 ENCOUNTER — HEALTH MAINTENANCE LETTER (OUTPATIENT)
Age: 75
End: 2020-02-16

## 2020-07-01 PROBLEM — G52.9: Status: ACTIVE | Noted: 2019-07-24

## 2020-10-07 ENCOUNTER — COMMUNICATION - HEALTHEAST (OUTPATIENT)
Dept: SCHEDULING | Facility: CLINIC | Age: 75
End: 2020-10-07

## 2020-10-09 ENCOUNTER — RECORDS - HEALTHEAST (OUTPATIENT)
Dept: LAB | Facility: CLINIC | Age: 75
End: 2020-10-09

## 2020-10-09 LAB
BASOPHILS # BLD AUTO: 0 THOU/UL (ref 0–0.2)
BASOPHILS NFR BLD AUTO: 1 % (ref 0–2)
EOSINOPHIL # BLD AUTO: 0.2 THOU/UL (ref 0–0.4)
EOSINOPHIL NFR BLD AUTO: 3 % (ref 0–6)
ERYTHROCYTE [DISTWIDTH] IN BLOOD BY AUTOMATED COUNT: 14.5 % (ref 11–14.5)
HCT VFR BLD AUTO: 26.3 % (ref 40–54)
HGB BLD-MCNC: 8.5 G/DL (ref 14–18)
IMM GRANULOCYTES # BLD: 0.3 THOU/UL
IMM GRANULOCYTES NFR BLD: 4 %
LYMPHOCYTES # BLD AUTO: 1.6 THOU/UL (ref 0.8–4.4)
LYMPHOCYTES NFR BLD AUTO: 20 % (ref 20–40)
MCH RBC QN AUTO: 30.8 PG (ref 27–34)
MCHC RBC AUTO-ENTMCNC: 32.3 G/DL (ref 32–36)
MCV RBC AUTO: 95 FL (ref 80–100)
MONOCYTES # BLD AUTO: 0.8 THOU/UL (ref 0–0.9)
MONOCYTES NFR BLD AUTO: 10 % (ref 2–10)
NEUTROPHILS # BLD AUTO: 5.1 THOU/UL (ref 2–7.7)
NEUTROPHILS NFR BLD AUTO: 63 % (ref 50–70)
PLATELET # BLD AUTO: 385 THOU/UL (ref 140–440)
PMV BLD AUTO: 9.8 FL (ref 8.5–12.5)
RBC # BLD AUTO: 2.76 MILL/UL (ref 4.4–6.2)
WBC: 8 THOU/UL (ref 4–11)

## 2020-10-13 ENCOUNTER — RECORDS - HEALTHEAST (OUTPATIENT)
Dept: LAB | Facility: CLINIC | Age: 75
End: 2020-10-13

## 2020-10-13 LAB
BASOPHILS # BLD AUTO: 0.1 THOU/UL (ref 0–0.2)
BASOPHILS NFR BLD AUTO: 1 % (ref 0–2)
EOSINOPHIL # BLD AUTO: 0.2 THOU/UL (ref 0–0.4)
EOSINOPHIL NFR BLD AUTO: 2 % (ref 0–6)
ERYTHROCYTE [DISTWIDTH] IN BLOOD BY AUTOMATED COUNT: 15.1 % (ref 11–14.5)
HCT VFR BLD AUTO: 29.1 % (ref 40–54)
HGB BLD-MCNC: 9 G/DL (ref 14–18)
IMM GRANULOCYTES # BLD: 0.1 THOU/UL
IMM GRANULOCYTES NFR BLD: 1 %
LYMPHOCYTES # BLD AUTO: 1.4 THOU/UL (ref 0.8–4.4)
LYMPHOCYTES NFR BLD AUTO: 17 % (ref 20–40)
MCH RBC QN AUTO: 29.7 PG (ref 27–34)
MCHC RBC AUTO-ENTMCNC: 30.9 G/DL (ref 32–36)
MCV RBC AUTO: 96 FL (ref 80–100)
MONOCYTES # BLD AUTO: 0.7 THOU/UL (ref 0–0.9)
MONOCYTES NFR BLD AUTO: 8 % (ref 2–10)
NEUTROPHILS # BLD AUTO: 6 THOU/UL (ref 2–7.7)
NEUTROPHILS NFR BLD AUTO: 71 % (ref 50–70)
PLATELET # BLD AUTO: 389 THOU/UL (ref 140–440)
PMV BLD AUTO: 9.6 FL (ref 8.5–12.5)
RBC # BLD AUTO: 3.03 MILL/UL (ref 4.4–6.2)
WBC: 8.5 THOU/UL (ref 4–11)

## 2020-10-28 ENCOUNTER — RECORDS - HEALTHEAST (OUTPATIENT)
Dept: LAB | Facility: CLINIC | Age: 75
End: 2020-10-28

## 2020-10-28 LAB
BASOPHILS # BLD AUTO: 0.1 THOU/UL (ref 0–0.2)
BASOPHILS NFR BLD AUTO: 1 % (ref 0–2)
EOSINOPHIL # BLD AUTO: 0.2 THOU/UL (ref 0–0.4)
EOSINOPHIL NFR BLD AUTO: 2 % (ref 0–6)
ERYTHROCYTE [DISTWIDTH] IN BLOOD BY AUTOMATED COUNT: 14.7 % (ref 11–14.5)
HCT VFR BLD AUTO: 40.4 % (ref 40–54)
HGB BLD-MCNC: 12.7 G/DL (ref 14–18)
IMM GRANULOCYTES # BLD: 0.1 THOU/UL
IMM GRANULOCYTES NFR BLD: 1 %
LYMPHOCYTES # BLD AUTO: 1.6 THOU/UL (ref 0.8–4.4)
LYMPHOCYTES NFR BLD AUTO: 17 % (ref 20–40)
MCH RBC QN AUTO: 30.4 PG (ref 27–34)
MCHC RBC AUTO-ENTMCNC: 31.4 G/DL (ref 32–36)
MCV RBC AUTO: 97 FL (ref 80–100)
MONOCYTES # BLD AUTO: 1.1 THOU/UL (ref 0–0.9)
MONOCYTES NFR BLD AUTO: 11 % (ref 2–10)
NEUTROPHILS # BLD AUTO: 6.4 THOU/UL (ref 2–7.7)
NEUTROPHILS NFR BLD AUTO: 68 % (ref 50–70)
PLATELET # BLD AUTO: 231 THOU/UL (ref 140–440)
PMV BLD AUTO: 10.5 FL (ref 8.5–12.5)
RBC # BLD AUTO: 4.18 MILL/UL (ref 4.4–6.2)
WBC: 9.3 THOU/UL (ref 4–11)

## 2020-11-29 ENCOUNTER — HEALTH MAINTENANCE LETTER (OUTPATIENT)
Age: 75
End: 2020-11-29

## 2021-01-11 ENCOUNTER — RECORDS - HEALTHEAST (OUTPATIENT)
Dept: ADMINISTRATIVE | Facility: OTHER | Age: 76
End: 2021-01-11

## 2021-01-20 ENCOUNTER — RECORDS - HEALTHEAST (OUTPATIENT)
Dept: ADMINISTRATIVE | Facility: OTHER | Age: 76
End: 2021-01-20

## 2021-01-21 ENCOUNTER — RECORDS - HEALTHEAST (OUTPATIENT)
Dept: LAB | Facility: CLINIC | Age: 76
End: 2021-01-21

## 2021-01-21 ENCOUNTER — COMMUNICATION - HEALTHEAST (OUTPATIENT)
Dept: ONCOLOGY | Facility: HOSPITAL | Age: 76
End: 2021-01-21

## 2021-01-21 LAB
ANION GAP SERPL CALCULATED.3IONS-SCNC: 9 MMOL/L (ref 5–18)
BUN SERPL-MCNC: 18 MG/DL (ref 8–28)
CALCIUM SERPL-MCNC: 9.5 MG/DL (ref 8.5–10.5)
CHLORIDE BLD-SCNC: 104 MMOL/L (ref 98–107)
CO2 SERPL-SCNC: 27 MMOL/L (ref 22–31)
CREAT SERPL-MCNC: 1.05 MG/DL (ref 0.7–1.3)
GFR SERPL CREATININE-BSD FRML MDRD: >60 ML/MIN/1.73M2
GLUCOSE BLD-MCNC: 82 MG/DL (ref 70–125)
POTASSIUM BLD-SCNC: 4.5 MMOL/L (ref 3.5–5)
SODIUM SERPL-SCNC: 140 MMOL/L (ref 136–145)

## 2021-01-22 ENCOUNTER — COMMUNICATION - HEALTHEAST (OUTPATIENT)
Dept: ONCOLOGY | Facility: HOSPITAL | Age: 76
End: 2021-01-22

## 2021-04-10 ENCOUNTER — HEALTH MAINTENANCE LETTER (OUTPATIENT)
Age: 76
End: 2021-04-10

## 2021-05-24 ENCOUNTER — RECORDS - HEALTHEAST (OUTPATIENT)
Dept: ADMINISTRATIVE | Facility: CLINIC | Age: 76
End: 2021-05-24

## 2021-05-25 ENCOUNTER — RECORDS - HEALTHEAST (OUTPATIENT)
Dept: ADMINISTRATIVE | Facility: CLINIC | Age: 76
End: 2021-05-25

## 2021-05-28 ENCOUNTER — RECORDS - HEALTHEAST (OUTPATIENT)
Dept: ADMINISTRATIVE | Facility: CLINIC | Age: 76
End: 2021-05-28

## 2021-05-31 ENCOUNTER — RECORDS - HEALTHEAST (OUTPATIENT)
Dept: ADMINISTRATIVE | Facility: CLINIC | Age: 76
End: 2021-05-31

## 2021-06-02 ENCOUNTER — RECORDS - HEALTHEAST (OUTPATIENT)
Dept: ADMINISTRATIVE | Facility: CLINIC | Age: 76
End: 2021-06-02

## 2021-06-02 VITALS — HEIGHT: 69 IN | WEIGHT: 245 LBS | BODY MASS INDEX: 36.29 KG/M2

## 2021-06-02 VITALS — HEIGHT: 69 IN | BODY MASS INDEX: 36.29 KG/M2 | WEIGHT: 245 LBS

## 2021-06-14 NOTE — TELEPHONE ENCOUNTER
I called to simon Dietz for an oncology consult, referred by Dr. Esparza of [facility], for his melanoma.  I reached his vm & I left my number for him to return my call.

## 2021-06-14 NOTE — TELEPHONE ENCOUNTER
New Patient Oncology Nurse Navigator Note    LATE ENTRY FOR 1/21/21     Referring provider: Dr. 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 s/p LN dissection (1/2011) and radiation (completed in April 2011)   *radiation with [facility] per pt report*    3) Self-reported h/o BCC on the chest many years ago     4) Melanoma (Breslow depth 0.65mm, non-ulcerated) on R upper back s/p wide local excision in 8/2012     5) Early melanoma in situ arising from a melanocytic nevus on L posterior thigh s/p wide local excision in 8/2012     6) Hypermelanotic melanocytic nevus on L posterior lower leg excised at time of bx in 7/2012     7) Severely atypical nevus on the L medial mid-back (1cm from spine) removed at time of bx in 11/2012     8) Severely atypical nevus and BCC on the L upper chest (5.5cm from sternum) removed at time of bx in 11/2012      He was followed by Dr. Serrano with [facility].     Of note: also has hx of   Perirectal mass in 2011 with biopsy showing a peripheral nerve sheath tumor consistent with Schwannoma.  There are records in [facility] from oncology with [facility] in 2019 and [facility], [facility].       He has recent finding of melanoma.  Referred to us by Dr. Esparza of [facility].  Patient reports he is scheduled to have surgery at [facility] with Dr. Sheldon on 1/26/21, post surgical f/u planned for 2/15/21.      Clinical Assessment / Barriers to Care (Per Nurse): na       Records Location (Care Everywhere,  Media, etc.):  [facility], unable to access care everywhere, some faxed by [facility]     Records Needed: [facility] surgery 1/26/21, Dr. Sheldon, [facility]     Additional testing needed prior to consult: na    I called and scheduled Mirela for an oncology consult with Dr. Betts on 2/19/21.  I mailed out to him a new patient packet along with a DHAVAL for him to send back to us to obtain any outside records needed.  I reviewed my role and encouraged that he contact me if needed for assistance.

## 2021-06-21 NOTE — LETTER
Letter by Krys Malin RN at      Author: Krys Malin RN Service: -- Author Type: --    Filed:  Encounter Date: 1/22/2021 Status: (Other)       Dear Mirela,    Thank you for choosing [facility] for your care.  We are committed to providing you with the highest quality and compassionate healthcare services.  The following information pertains to your first appointment with our clinic.      Due to the current COVID-19 pandemic, visitor restrictions are place.  You must arrive to your appointment unaccompanied, however, a friend or family member is welcome to join the appointment by phone.        For your protection and the protection of other patients and staff, please arrive for your appointment wearing a mask. Homemade masks, earloop masks, and clothing, such as a bandana or scarf, that covers your nose and mouth are acceptable. If you are interested, our website [email or web address] has instructions on how to make a mask.    Date/Time of appointment: Friday, February 19, 2021, 9:30 AM arrival       Name of your Physician: Dr. Irina Betts    What to bring to your appointment:      Completed Patient History/Initial Nursing Assessment and Medication/Allergy List (these forms were sent to you).    Any paperwork or films from your physician that we have asked you to bring.    Your current insurance card(s).    Parking:      Please refer to the map included to direct you.  The [facility] is located at the [place] end of [facility] in [place], [place].      After turning onto [address] from [address], take a right turn at the first stop sign.  We have designated parking on the left, identified as parking for Cancer Care patients (Lot D).     The Code to Enter Lot D is: 0201. This code changes monthly and will always coincide with the current month followed by 01. For example August will be 0801.  The month will continue to change but the 01 will remain constant.  If lot  D is full please use Parking Lot A, directly across the street.    Please enter the Cancer Care Center on the north end of the hospital.  You will see a sign on the building.        For Medical Oncology or Hematology appointments, please take the elevator to the second floor to check in.     Also please note appointments can last 1.5-2 hours.      We hope these instructions are helpful to you.  If you have any questions or concerns, please call us at [phone].  It is our pleasure to assist you.    Warm Regards,  Arleen Malin, JOSE EDUARDO  Nurse Navigator  [phone]

## 2021-06-24 NOTE — H&P
[facility] Radiology History and Physical Note    Procedure Requested: CT guided pelvic nodule biopsy.  Requesting Provider: Dr. Rausch.    HPI: Walt Jennings is a 74 y.o. old male with FDG avid nodules along the pelvis and brenda-rectal regions.     IMAGIN2019 PET/CT.    NPO Status: NPO for procedure.  Anticoagulation/Antiplatelets/Bleeding tendencies: On hold as applicable.   Antibiotics: N/A.    REVIEW OF SYMPTOMS: None.    PAST MEDICAL HISTORY:   Past Medical History:   Diagnosis Date     Degenerative joint disease of right hip      Low serum HDL      Melanoma of neck (H)      Obesity      Osteoarthritis      Personal history of DVT (deep vein thrombosis)      Scoliosis      Squamous cell carcinoma     unknown primary       PAST SURGICAL HISTORY:  Past Surgical History:   Procedure Laterality Date     APPENDECTOMY       BACK SURGERY      lumbar decompression     INGUINAL HERNIA REPAIR Left 2015    Procedure: HERNIA REPAIR INGUINAL, LEFT ;  Surgeon: Carlo Bolanos MD;  Location: [facility];  Service:      JOINT REPLACEMENT Left 2008    revision 2011     JOINT REPLACEMENT Right      KNEE ARTHROSCOPY Right      NASAL/SINUS ENDOSCOPY  2010     RADICAL NECK DISSECTION Left      TONSILLECTOMY N/A     laterality not specified on H&P       ALLERGIES:  Percocet [oxycodone-acetaminophen]    MEDICATIONS:  Current Outpatient Medications   Medication Sig Dispense Refill     diphenhydrAMINE-acetaminophen (TYLENOL PM)  mg Tab Take 1 tablet by mouth at bedtime as needed.       HYDROcodone-acetaminophen 5-325 mg per tablet Take 1 tablet by mouth every 4 (four) hours as needed for pain. 10 tablet 0     HYDROmorphone (DILAUDID) 2 MG tablet Take 1 tablet (2 mg total) by mouth every 4 (four) hours as needed for pain. 40 tablet 0     ondansetron (ZOFRAN ODT) 4 MG disintegrating tablet Take 1 tablet (4 mg total) by mouth every 8 (eight) hours as needed. 15 tablet 0     sildenafil  "(VIAGRA) 100 MG tablet Take 100 mg by mouth daily as needed for erectile dysfunction.       No current facility-administered medications for this encounter.        LABS:  INR (no units)   Date Value   02/21/2019 0.96     Hemoglobin (g/dL)   Date Value   02/21/2019 15.1     Platelets (thou/uL)   Date Value   02/21/2019 213     Creatinine (mg/dL)   Date Value   02/01/2019 0.86     Potassium (mmol/L)   Date Value   02/01/2019 4.0       EXAM:  /82   Pulse 69   Temp 98.3  F (36.8  C) (Oral)   Resp 20   Ht 5' 9\" (1.753 m)   Wt (!) 245 lb (111.1 kg)   SpO2 98%   BMI 36.18 kg/m    General: Stable. In no acute distress.  Neuro: A&O x 3. Moves all extremities equally.  Resp: Lungs clear to auscultation bilaterally.  Cardio: S1S2 and reg, without murmur, clicks or rubs  Abdomen: Soft, non-distended, non-tender, positive bowel sounds.  Vascular: +2/4 bilateral femoral pulses, +2/4 bilateral dorsalis pedis pulses, +2/4 bilateral posterior tibial pulses.  Skin: Without excoriations, ecchymosis, erythema, lesions.  MSK: No gross motor weakness. Sensation intact. Proprioception intact.    Pre-Sedation Assessment:  Mallampati Airway Classification: II  Previous reaction to anesthesia/sedation: NO  Sedation plan based on assessment: Moderate  ASA Classification: III    ASSESSMENT: 74 year old male presents for CT guided pelvic nodule biopsy.    PLAN: CT guided pelvic nodule biopsy.    The procedure, risks and moderate sedation were discussed with the patient, all questions answered and patient agrees to proceed with the procedure. Written consent obtained.    Bernard UGALDE [facility]: Interventional Radiology   [phone]    "

## 2021-06-24 NOTE — PROGRESS NOTES
02/21/19 0833   Provider Pre-Sedation Assessment   Difficult Airway?  No   Plan for Sedation Moderate   Assessment reviewed by proceduralist Yes   Update H&P Completed Updated H&P in Update H&P Note section

## 2021-09-19 ENCOUNTER — HEALTH MAINTENANCE LETTER (OUTPATIENT)
Age: 76
End: 2021-09-19

## 2021-11-12 ENCOUNTER — LAB REQUISITION (OUTPATIENT)
Dept: LAB | Facility: CLINIC | Age: 76
End: 2021-11-12

## 2021-11-12 DIAGNOSIS — E78.6 LIPOPROTEIN DEFICIENCY: ICD-10-CM

## 2021-11-12 DIAGNOSIS — I10 ESSENTIAL (PRIMARY) HYPERTENSION: ICD-10-CM

## 2021-11-12 LAB
ALBUMIN SERPL-MCNC: 4.1 G/DL (ref 3.5–5)
ALP SERPL-CCNC: 67 U/L (ref 45–120)
ALT SERPL W P-5'-P-CCNC: 15 U/L (ref 0–45)
ANION GAP SERPL CALCULATED.3IONS-SCNC: 10 MMOL/L (ref 5–18)
AST SERPL W P-5'-P-CCNC: 21 U/L (ref 0–40)
BILIRUB SERPL-MCNC: 0.8 MG/DL (ref 0–1)
BUN SERPL-MCNC: 20 MG/DL (ref 8–28)
CALCIUM SERPL-MCNC: 9.8 MG/DL (ref 8.5–10.5)
CHLORIDE BLD-SCNC: 106 MMOL/L (ref 98–107)
CHOLEST SERPL-MCNC: 168 MG/DL
CO2 SERPL-SCNC: 24 MMOL/L (ref 22–31)
CREAT SERPL-MCNC: 1.12 MG/DL (ref 0.7–1.3)
ERYTHROCYTE [DISTWIDTH] IN BLOOD BY AUTOMATED COUNT: 11.9 % (ref 10–15)
GFR SERPL CREATININE-BSD FRML MDRD: 64 ML/MIN/1.73M2
GLUCOSE BLD-MCNC: 92 MG/DL (ref 70–125)
HCT VFR BLD AUTO: 43 % (ref 40–53)
HDLC SERPL-MCNC: 43 MG/DL
HGB BLD-MCNC: 14.7 G/DL (ref 13.3–17.7)
LDLC SERPL CALC-MCNC: 108 MG/DL
MCH RBC QN AUTO: 31.7 PG (ref 26.5–33)
MCHC RBC AUTO-ENTMCNC: 34.2 G/DL (ref 31.5–36.5)
MCV RBC AUTO: 93 FL (ref 78–100)
PLATELET # BLD AUTO: 212 10E3/UL (ref 150–450)
POTASSIUM BLD-SCNC: 4.4 MMOL/L (ref 3.5–5)
PROT SERPL-MCNC: 6.6 G/DL (ref 6–8)
RBC # BLD AUTO: 4.64 10E6/UL (ref 4.4–5.9)
SODIUM SERPL-SCNC: 140 MMOL/L (ref 136–145)
TRIGL SERPL-MCNC: 83 MG/DL
WBC # BLD AUTO: 9.1 10E3/UL (ref 4–11)

## 2021-11-12 PROCEDURE — 80061 LIPID PANEL: CPT | Performed by: FAMILY MEDICINE

## 2021-11-12 PROCEDURE — 85027 COMPLETE CBC AUTOMATED: CPT | Performed by: FAMILY MEDICINE

## 2021-11-12 PROCEDURE — 80053 COMPREHEN METABOLIC PANEL: CPT | Performed by: FAMILY MEDICINE

## 2021-11-14 ENCOUNTER — HEALTH MAINTENANCE LETTER (OUTPATIENT)
Age: 76
End: 2021-11-14

## 2022-11-15 ENCOUNTER — LAB REQUISITION (OUTPATIENT)
Dept: LAB | Facility: CLINIC | Age: 77
End: 2022-11-15

## 2022-11-15 DIAGNOSIS — Z00.00 ENCOUNTER FOR GENERAL ADULT MEDICAL EXAMINATION WITHOUT ABNORMAL FINDINGS: ICD-10-CM

## 2022-11-15 DIAGNOSIS — I10 ESSENTIAL (PRIMARY) HYPERTENSION: ICD-10-CM

## 2022-11-15 LAB
ALBUMIN SERPL BCG-MCNC: 4.4 G/DL (ref 3.5–5.2)
ALP SERPL-CCNC: 72 U/L (ref 40–129)
ALT SERPL W P-5'-P-CCNC: 18 U/L (ref 10–50)
ANION GAP SERPL CALCULATED.3IONS-SCNC: 12 MMOL/L (ref 7–15)
AST SERPL W P-5'-P-CCNC: 27 U/L (ref 10–50)
BILIRUB SERPL-MCNC: 1.2 MG/DL
BUN SERPL-MCNC: 15.5 MG/DL (ref 8–23)
CALCIUM SERPL-MCNC: 9.4 MG/DL (ref 8.8–10.2)
CHLORIDE SERPL-SCNC: 101 MMOL/L (ref 98–107)
CHOLEST SERPL-MCNC: 170 MG/DL
CREAT SERPL-MCNC: 0.99 MG/DL (ref 0.67–1.17)
DEPRECATED HCO3 PLAS-SCNC: 26 MMOL/L (ref 22–29)
GFR SERPL CREATININE-BSD FRML MDRD: 79 ML/MIN/1.73M2
GLUCOSE SERPL-MCNC: 82 MG/DL (ref 70–99)
HDLC SERPL-MCNC: 46 MG/DL
LDLC SERPL CALC-MCNC: 110 MG/DL
NONHDLC SERPL-MCNC: 124 MG/DL
POTASSIUM SERPL-SCNC: 4.4 MMOL/L (ref 3.4–5.3)
PROT SERPL-MCNC: 6.3 G/DL (ref 6.4–8.3)
SODIUM SERPL-SCNC: 139 MMOL/L (ref 136–145)
TRIGL SERPL-MCNC: 72 MG/DL

## 2022-11-15 PROCEDURE — 82040 ASSAY OF SERUM ALBUMIN: CPT | Performed by: STUDENT IN AN ORGANIZED HEALTH CARE EDUCATION/TRAINING PROGRAM

## 2022-11-15 PROCEDURE — 80061 LIPID PANEL: CPT | Performed by: STUDENT IN AN ORGANIZED HEALTH CARE EDUCATION/TRAINING PROGRAM

## 2022-11-15 PROCEDURE — 80053 COMPREHEN METABOLIC PANEL: CPT | Performed by: STUDENT IN AN ORGANIZED HEALTH CARE EDUCATION/TRAINING PROGRAM

## 2022-11-21 ENCOUNTER — HEALTH MAINTENANCE LETTER (OUTPATIENT)
Age: 77
End: 2022-11-21

## 2022-12-25 ENCOUNTER — HEALTH MAINTENANCE LETTER (OUTPATIENT)
Age: 77
End: 2022-12-25

## 2023-11-20 ENCOUNTER — LAB REQUISITION (OUTPATIENT)
Dept: LAB | Facility: CLINIC | Age: 78
End: 2023-11-20

## 2023-11-20 DIAGNOSIS — Z13.220 ENCOUNTER FOR SCREENING FOR LIPOID DISORDERS: ICD-10-CM

## 2023-11-20 DIAGNOSIS — I10 ESSENTIAL (PRIMARY) HYPERTENSION: ICD-10-CM

## 2023-11-20 PROCEDURE — 82374 ASSAY BLOOD CARBON DIOXIDE: CPT | Performed by: STUDENT IN AN ORGANIZED HEALTH CARE EDUCATION/TRAINING PROGRAM

## 2023-11-20 PROCEDURE — 80061 LIPID PANEL: CPT | Performed by: STUDENT IN AN ORGANIZED HEALTH CARE EDUCATION/TRAINING PROGRAM

## 2023-11-20 PROCEDURE — 82310 ASSAY OF CALCIUM: CPT | Performed by: STUDENT IN AN ORGANIZED HEALTH CARE EDUCATION/TRAINING PROGRAM

## 2023-11-21 LAB
ANION GAP SERPL CALCULATED.3IONS-SCNC: 11 MMOL/L (ref 7–15)
BUN SERPL-MCNC: 22.3 MG/DL (ref 8–23)
CALCIUM SERPL-MCNC: 9.6 MG/DL (ref 8.8–10.2)
CHLORIDE SERPL-SCNC: 108 MMOL/L (ref 98–107)
CHOLEST SERPL-MCNC: 139 MG/DL
CREAT SERPL-MCNC: 1.13 MG/DL (ref 0.67–1.17)
DEPRECATED HCO3 PLAS-SCNC: 25 MMOL/L (ref 22–29)
EGFRCR SERPLBLD CKD-EPI 2021: 67 ML/MIN/1.73M2
GLUCOSE SERPL-MCNC: 91 MG/DL (ref 70–99)
HDLC SERPL-MCNC: 33 MG/DL
LDLC SERPL CALC-MCNC: 83 MG/DL
NONHDLC SERPL-MCNC: 106 MG/DL
POTASSIUM SERPL-SCNC: 4.1 MMOL/L (ref 3.4–5.3)
SODIUM SERPL-SCNC: 144 MMOL/L (ref 135–145)
TRIGL SERPL-MCNC: 113 MG/DL

## 2024-02-04 ENCOUNTER — HEALTH MAINTENANCE LETTER (OUTPATIENT)
Age: 79
End: 2024-02-04

## 2024-11-15 ENCOUNTER — LAB REQUISITION (OUTPATIENT)
Dept: LAB | Facility: CLINIC | Age: 79
End: 2024-11-15

## 2024-11-15 DIAGNOSIS — Z13.220 ENCOUNTER FOR SCREENING FOR LIPOID DISORDERS: ICD-10-CM

## 2024-11-15 DIAGNOSIS — I10 ESSENTIAL (PRIMARY) HYPERTENSION: ICD-10-CM

## 2024-11-15 LAB
ALBUMIN SERPL BCG-MCNC: 4.5 G/DL (ref 3.5–5.2)
ALP SERPL-CCNC: 68 U/L (ref 40–150)
ALT SERPL W P-5'-P-CCNC: 18 U/L (ref 0–70)
ANION GAP SERPL CALCULATED.3IONS-SCNC: 10 MMOL/L (ref 7–15)
AST SERPL W P-5'-P-CCNC: 26 U/L (ref 0–45)
BILIRUB SERPL-MCNC: 1.1 MG/DL
BUN SERPL-MCNC: 22.5 MG/DL (ref 8–23)
CALCIUM SERPL-MCNC: 10 MG/DL (ref 8.8–10.4)
CHLORIDE SERPL-SCNC: 105 MMOL/L (ref 98–107)
CHOLEST SERPL-MCNC: 168 MG/DL
CREAT SERPL-MCNC: 1.02 MG/DL (ref 0.67–1.17)
EGFRCR SERPLBLD CKD-EPI 2021: 75 ML/MIN/1.73M2
FASTING STATUS PATIENT QL REPORTED: ABNORMAL
FASTING STATUS PATIENT QL REPORTED: NORMAL
GLUCOSE SERPL-MCNC: 98 MG/DL (ref 70–99)
HCO3 SERPL-SCNC: 26 MMOL/L (ref 22–29)
HDLC SERPL-MCNC: 46 MG/DL
LDLC SERPL CALC-MCNC: 109 MG/DL
NONHDLC SERPL-MCNC: 122 MG/DL
POTASSIUM SERPL-SCNC: 5.1 MMOL/L (ref 3.4–5.3)
PROT SERPL-MCNC: 7.1 G/DL (ref 6.4–8.3)
SODIUM SERPL-SCNC: 141 MMOL/L (ref 135–145)
TRIGL SERPL-MCNC: 63 MG/DL

## 2024-11-15 PROCEDURE — 80051 ELECTROLYTE PANEL: CPT | Performed by: STUDENT IN AN ORGANIZED HEALTH CARE EDUCATION/TRAINING PROGRAM

## 2024-11-15 PROCEDURE — 80061 LIPID PANEL: CPT | Performed by: STUDENT IN AN ORGANIZED HEALTH CARE EDUCATION/TRAINING PROGRAM
